# Patient Record
Sex: FEMALE | Race: WHITE | NOT HISPANIC OR LATINO | Employment: OTHER | URBAN - METROPOLITAN AREA
[De-identification: names, ages, dates, MRNs, and addresses within clinical notes are randomized per-mention and may not be internally consistent; named-entity substitution may affect disease eponyms.]

---

## 2017-05-09 ENCOUNTER — APPOINTMENT (OUTPATIENT)
Dept: WOMENS IMAGING | Facility: HOSPITAL | Age: 72
End: 2017-05-09

## 2017-05-09 PROCEDURE — 77063 BREAST TOMOSYNTHESIS BI: CPT | Performed by: RADIOLOGY

## 2017-05-09 PROCEDURE — G0202 SCR MAMMO BI INCL CAD: HCPCS | Performed by: RADIOLOGY

## 2018-05-29 ENCOUNTER — APPOINTMENT (OUTPATIENT)
Dept: WOMENS IMAGING | Facility: HOSPITAL | Age: 73
End: 2018-05-29

## 2018-05-29 PROCEDURE — 77067 SCR MAMMO BI INCL CAD: CPT | Performed by: RADIOLOGY

## 2018-05-29 PROCEDURE — 77063 BREAST TOMOSYNTHESIS BI: CPT | Performed by: RADIOLOGY

## 2018-11-02 ENCOUNTER — OFFICE VISIT (OUTPATIENT)
Dept: CARDIOLOGY | Facility: CLINIC | Age: 73
End: 2018-11-02

## 2018-11-02 VITALS
WEIGHT: 196 LBS | DIASTOLIC BLOOD PRESSURE: 90 MMHG | BODY MASS INDEX: 37 KG/M2 | HEIGHT: 61 IN | SYSTOLIC BLOOD PRESSURE: 150 MMHG | HEART RATE: 72 BPM

## 2018-11-02 DIAGNOSIS — E78.2 MIXED HYPERLIPIDEMIA: ICD-10-CM

## 2018-11-02 DIAGNOSIS — R00.2 PALPITATIONS: Primary | ICD-10-CM

## 2018-11-02 DIAGNOSIS — I49.1 APC (ATRIAL PREMATURE CONTRACTIONS): ICD-10-CM

## 2018-11-02 DIAGNOSIS — E66.9 OBESITY, CLASS II, BMI 35-39.9, ISOLATED (SEE ACTUAL BMI): ICD-10-CM

## 2018-11-02 PROCEDURE — 93000 ELECTROCARDIOGRAM COMPLETE: CPT | Performed by: INTERNAL MEDICINE

## 2018-11-02 PROCEDURE — 99214 OFFICE O/P EST MOD 30 MIN: CPT | Performed by: INTERNAL MEDICINE

## 2018-11-02 RX ORDER — LEVOTHYROXINE SODIUM 0.03 MG/1
25 TABLET ORAL DAILY
COMMUNITY
End: 2022-06-02 | Stop reason: SDUPTHER

## 2018-11-02 RX ORDER — SENNOSIDES 8.6 MG
1 TABLET ORAL NIGHTLY
COMMUNITY

## 2018-11-02 RX ORDER — GABAPENTIN 300 MG/1
300 CAPSULE ORAL EVERY EVENING
COMMUNITY

## 2018-11-02 RX ORDER — BENZALKONIUM CHLORIDE 170 MG/89ML
1 GEL TOPICAL 2 TIMES DAILY
COMMUNITY
End: 2020-01-14 | Stop reason: HOSPADM

## 2018-11-02 RX ORDER — CLINDAMYCIN PHOSPHATE 10 MG/G
GEL TOPICAL DAILY
COMMUNITY
End: 2020-01-06

## 2018-11-02 RX ORDER — TRIAMCINOLONE ACETONIDE 55 UG/1
2 SPRAY, METERED NASAL DAILY
COMMUNITY

## 2018-11-02 RX ORDER — BUPROPION HYDROCHLORIDE 150 MG/1
150 TABLET, EXTENDED RELEASE ORAL EVERY MORNING
COMMUNITY
End: 2022-06-02 | Stop reason: SDUPTHER

## 2018-11-02 NOTE — PROGRESS NOTES
Date of Office Visit: 18  Encounter Provider: Alex Poole MD  Place of Service: [facility] CARDIOLOGY  Patient Name: Valerie Rock  :1945  Referral Provider:Alex oPole MD      Chief Complaint   Patient presents with   • Shortness of Breath   • Fatigue     History of Present Illness  The patient is a pleasant 73-year-old female who we have seen in the past for atypical  chest pain, a normal cardiac catheterization. Echocardiogram showed some mild aortic root  enlargement and mild aortic insufficiency, and mitral valve prolapse; however, a followup  echocardiogram in  did not show any of those things.  She comes in for follow-up.  It's been a couple years since she was last here.  She's had thyroid surgery another nodule was benign but she has Hashimoto's thyroiditis.  She also has severe bladder incontinence which is been an extreme problem it's been keeping her from doing any kind of activity at all including walking.  Because that she's gained weight.  She does have shortness of breath ongoing about 10 minutes of activity but her back also hurts which inhibits that.  She it's occasional increased heart rate maybe 2 times a week lasting about a minute without any associated symptoms.  Denies chest pain or pressure.  Denies orthopnea or PND denies near syncope or syncope.  Overall she is a little distraught over this urinary incontinence really kept her from doing almost anything.          Past Medical History:   Diagnosis Date   • Aortic root enlargement (CMS/HCC)    • Aortic valve insufficiency    • Chest pain    • Hashimoto's thyroiditis    • Hyperlipidemia    • MVP (mitral valve prolapse)    • Urinary incontinence    • Vaginal prolapse          Past Surgical History:   Procedure Laterality Date   • BLADDER SURGERY     • CARDIAC CATHETERIZATION     • COLONOSCOPY     • CYSTOSCOPY BOTOX INJECTION OF BLADDER     • HYSTERECTOMY     • SACROSPINUS SUSPENSION      • THYROID SURGERY     • TONSILLECTOMY           Current Outpatient Prescriptions on File Prior to Visit   Medication Sig Dispense Refill   • albuterol (PROVENTIL HFA) 108 (90 BASE) MCG/ACT inhaler Proventil HFA AERS; Patient Sig: Proventil HFA AERS ; 0; 04-Apr-2014; Active     • albuterol (PROVENTIL) (2.5 MG/3ML) 0.083% nebulizer solution Albuterol Sulfate (2.5 MG/3ML) 0.083% Inhalation Nebulization Solution; Patient Sig: Albuterol Sulfate (2.5 MG/3ML) 0.083% Inhalation Nebulization Solution USE 1 UNIT DOSE EVERY 4-6 HOURS AS NEEDED FOR WHEEZING .; 0; 04-Apr-2014; Active     • Ascorbic Acid (VITAMIN C) 500 MG capsule Take 2 tablets by mouth 2 (two) times a day.     • aspirin 81 MG EC tablet Take  by mouth daily.     • budesonide-formoterol (SYMBICORT) 80-4.5 MCG/ACT inhaler Inhale 2 puffs 2 (two) times a day.     • Cholecalciferol (VITAMIN D3) 2000 UNITS capsule Take  by mouth 2 (two) times a day.     • citalopram (CeleXA) 40 MG tablet Take  by mouth daily.     • Coenzyme Q10 (COQ-10 PO) Take  by mouth daily.     • Dextromethorphan-Guaifenesin (MUCINEX DM MAXIMUM STRENGTH)  MG tablet sustained-release 12 hour Take  by mouth as needed.     • Ferrous Sulfate (IRON) 325 (65 FE) MG tablet Take 2 tablets by mouth As Needed.     • fexofenadine (ALLEGRA) 180 MG tablet Take  by mouth daily.     • Magnesium 500 MG capsule Take  by mouth daily.     • Omega-3 Fatty Acids (OMEGA-3 FISH OIL PO) Take  by mouth 2 (two) times a day.     • pantoprazole (PROTONIX) 40 MG EC tablet Take  by mouth daily.     • polyvinyl alcohol-povidone (REFRESH) 1.4-0.6 % ophthalmic solution Apply  to eye.     • pravastatin (PRAVACHOL) 40 MG tablet Take  by mouth daily.     • Pyridoxine HCl (VITAMIN B-6 PO) Take  by mouth daily.     • raloxifene (EVISTA) 60 MG tablet Take  by mouth daily.     • vitamin B-12 (CYANOCOBALAMIN) 250 MCG tablet Take  by mouth daily.     • Vitamin E 400 UNITS tablet Take  by mouth daily.     • [DISCONTINUED]  Calcium Carbonate (CALCIUM CHEW) 500 MG chewable tablet Chew 1 tablet 3 (three) times a day.     • [DISCONTINUED] Glucosamine HCl 1500 MG tablet Take  by mouth daily.     • [DISCONTINUED] Lactulose powder 2 (two) times a day.     • [DISCONTINUED] Resveratrol 50 MG capsule Take  by mouth daily.     • [DISCONTINUED] sodium chloride (OCEAN) 0.65 % nasal spray into each nostril.       No current facility-administered medications on file prior to visit.          Social History     Social History   • Marital status:      Spouse name: N/A   • Number of children: N/A   • Years of education: N/A     Occupational History   • Not on file.     Social History Main Topics   • Smoking status: Never Smoker   • Smokeless tobacco: Not on file   • Alcohol use No   • Drug use: Unknown   • Sexual activity: Defer     Other Topics Concern   • Not on file     Social History Narrative   • No narrative on file       Family History   Problem Relation Age of Onset   • Heart attack Mother    • Stroke Mother          Review of Systems   Constitution: Positive for malaise/fatigue. Negative for decreased appetite, diaphoresis, fever, weakness, weight gain and weight loss.   HENT: Negative for congestion, hearing loss, nosebleeds and tinnitus.    Eyes: Negative for blurred vision, double vision, vision loss in left eye, vision loss in right eye and visual disturbance.   Cardiovascular:        As noted in HPI   Respiratory:        As noted HPI   Endocrine: Negative for cold intolerance and heat intolerance.   Hematologic/Lymphatic: Negative for bleeding problem. Does not bruise/bleed easily.   Skin: Negative for color change, flushing, itching and rash.   Musculoskeletal: Negative for arthritis, back pain, joint pain, joint swelling, muscle weakness and myalgias.   Gastrointestinal: Negative for bloating, abdominal pain, constipation, diarrhea, dysphagia, heartburn, hematemesis, hematochezia, melena, nausea and vomiting.   Genitourinary:  "Negative for bladder incontinence, dysuria, frequency, nocturia and urgency.   Neurological: Negative for dizziness, focal weakness, headaches, light-headedness, loss of balance, numbness, paresthesias and vertigo.   Psychiatric/Behavioral: Positive for depression. Negative for memory loss and substance abuse. The patient is nervous/anxious.        Procedures      ECG 12 Lead  Date/Time: 11/2/2018 3:23 PM  Performed by: SHIRA WOOD  Authorized by: SHIRA WOOD   Comparison: compared with previous ECG   Similar to previous ECG  Rhythm: sinus rhythm  Ectopy: atrial premature contractions  Rate: normal  QRS axis: normal                  Objective:    /90 (BP Location: Left arm)   Pulse 72   Ht 154.9 cm (61\")   Wt 88.9 kg (196 lb)   BMI 37.03 kg/m²        Physical Exam  Physical Exam   Constitutional: She is oriented to person, place, and time. She appears well-developed and well-nourished. No distress.   HENT:   Head: Normocephalic.   Eyes: Pupils are equal, round, and reactive to light. Conjunctivae are normal. No scleral icterus.   Neck: Normal carotid pulses, no hepatojugular reflux and no JVD present. Carotid bruit is not present. No tracheal deviation, no edema and no erythema present. No thyromegaly present.   Cardiovascular: Normal rate, regular rhythm, S1 normal, S2 normal, normal heart sounds and intact distal pulses.  Frequent extrasystoles are present. PMI is not displaced.  Exam reveals no gallop, no distant heart sounds and no friction rub.    No murmur heard.  Pulses:       Carotid pulses are 2+ on the right side, and 2+ on the left side.       Radial pulses are 2+ on the right side, and 2+ on the left side.        Femoral pulses are 2+ on the right side, and 2+ on the left side.       Dorsalis pedis pulses are 2+ on the right side, and 2+ on the left side.        Posterior tibial pulses are 2+ on the right side, and 2+ on the left side.   Pulmonary/Chest: Effort normal and breath " sounds normal. No respiratory distress. She has no decreased breath sounds. She has no wheezes. She has no rhonchi. She has no rales. She exhibits no tenderness.   Abdominal: Soft. Bowel sounds are normal. She exhibits no distension and no mass. There is no hepatosplenomegaly. There is no tenderness. There is no rebound and no guarding.   Musculoskeletal: She exhibits edema (1+ bilateral pretibial). She exhibits no tenderness or deformity.   Neurological: She is alert and oriented to person, place, and time.   Skin: Skin is warm and dry. No rash noted. She is not diaphoretic. No cyanosis or erythema. No pallor. Nails show no clubbing.   Psychiatric: She has a normal mood and affect. Her speech is normal and behavior is normal. Judgment and thought content normal.           Assessment:   1. This is a 73-year-old female  History of valvular heart disease and aortic enlargement with aortic insufficiency and questionable mitral valve prolapse; however, follow-up echocardiogram did not confirm this.  Follow-up echocardiogram in 2016 no evidence of aortic insufficiency normal LV function and no prolapse.  2.  Palpitations/this appears to be due to premature atrial beats noted on her EKG she's probably having brief runs with these episodes of palpitations with asked her to keep track of those if they increase in frequency or severity she's to call back if not we'll see us in follow-up in a year.  3. History of hyperlipidemia.  on pravastatin followed by PCP.  4.  Hashimoto's thyroiditis positive endocrine.  5.  Severe urinary incontinence working with urology she feels like if she could get this under control she concern exercising and losing some weight.  6.  Obesity body mass index of 37.  I really do believe she wants to try to exercise and lose weight.hopefully will benefit started on that.         Plan:

## 2019-08-20 ENCOUNTER — APPOINTMENT (OUTPATIENT)
Dept: WOMENS IMAGING | Facility: HOSPITAL | Age: 74
End: 2019-08-20

## 2019-08-20 PROCEDURE — 77067 SCR MAMMO BI INCL CAD: CPT | Performed by: RADIOLOGY

## 2019-08-20 PROCEDURE — 77063 BREAST TOMOSYNTHESIS BI: CPT | Performed by: RADIOLOGY

## 2019-11-13 ENCOUNTER — OFFICE VISIT (OUTPATIENT)
Dept: CARDIOLOGY | Facility: CLINIC | Age: 74
End: 2019-11-13

## 2019-11-13 VITALS
WEIGHT: 186.8 LBS | HEART RATE: 71 BPM | DIASTOLIC BLOOD PRESSURE: 62 MMHG | BODY MASS INDEX: 36.67 KG/M2 | SYSTOLIC BLOOD PRESSURE: 132 MMHG | HEIGHT: 60 IN | RESPIRATION RATE: 16 BRPM

## 2019-11-13 DIAGNOSIS — R00.2 PALPITATIONS: Primary | ICD-10-CM

## 2019-11-13 DIAGNOSIS — I49.1 APC (ATRIAL PREMATURE CONTRACTIONS): ICD-10-CM

## 2019-11-13 DIAGNOSIS — E78.2 MIXED HYPERLIPIDEMIA: ICD-10-CM

## 2019-11-13 DIAGNOSIS — E66.9 OBESITY, CLASS II, BMI 35-39.9, ISOLATED (SEE ACTUAL BMI): ICD-10-CM

## 2019-11-13 PROCEDURE — 99213 OFFICE O/P EST LOW 20 MIN: CPT | Performed by: INTERNAL MEDICINE

## 2019-11-13 PROCEDURE — 93000 ELECTROCARDIOGRAM COMPLETE: CPT | Performed by: INTERNAL MEDICINE

## 2019-11-13 RX ORDER — PROMETHAZINE HYDROCHLORIDE AND PHENYLEPHRINE HYDROCHLORIDE 6.25; 5 MG/5ML; MG/5ML
SYRUP ORAL EVERY 4 HOURS PRN
COMMUNITY
End: 2020-01-06

## 2019-11-13 RX ORDER — BENZONATATE 200 MG/1
200 CAPSULE ORAL 3 TIMES DAILY PRN
COMMUNITY
End: 2020-01-06

## 2019-11-13 NOTE — PROGRESS NOTES
Date of Office Visit: 19  Encounter Provider: Alex Poole MD  Place of Service: [facility] CARDIOLOGY  Patient Name: Valerie Rock  :1945  Referral Provider:Referring, Self      No chief complaint on file.    History of Present Illness  The patient is a pleasant 74-year-old female who we have seen in the past for atypical  chest pain, a normal cardiac catheterization. Echocardiogram showed some mild aortic root  enlargement and mild aortic insufficiency, and mitral valve prolapse; however, a followup  echocardiogram in  did not show any of those things.  She comes in for follow-up.  Doing about the same she may get palpitations a few times a week lasting a few minutes without any associated symptoms.  She has had some balance issues and tripped and fell about 3 times its been a while though now.  No dizziness, near-syncope or syncope.  No chest pain or pressure.  No shortness of breath, orthopnea, or PND.  Overall she feels like she is doing reasonably well.          Past Medical History:   Diagnosis Date   • Aortic root enlargement (CMS/HCC)    • Aortic valve insufficiency    • Chest pain    • Hashimoto's thyroiditis    • Hyperlipidemia    • MVP (mitral valve prolapse)    • Urinary incontinence    • Vaginal prolapse          Past Surgical History:   Procedure Laterality Date   • BLADDER SURGERY     • CARDIAC CATHETERIZATION     • COLONOSCOPY     • CYSTOSCOPY BOTOX INJECTION OF BLADDER     • HYSTERECTOMY     • SACROSPINUS SUSPENSION     • THYROID SURGERY     • TONSILLECTOMY           Current Outpatient Medications on File Prior to Visit   Medication Sig Dispense Refill   • albuterol (PROVENTIL HFA) 108 (90 BASE) MCG/ACT inhaler Proventil HFA AERS; Patient Sig: Proventil HFA AERS ; 0; 2014; Active     • albuterol (PROVENTIL) (2.5 MG/3ML) 0.083% nebulizer solution Albuterol Sulfate (2.5 MG/3ML) 0.083% Inhalation Nebulization Solution; Patient Sig: Albuterol  Sulfate (2.5 MG/3ML) 0.083% Inhalation Nebulization Solution USE 1 UNIT DOSE EVERY 4-6 HOURS AS NEEDED FOR WHEEZING .; 0; 04-Apr-2014; Active     • Ascorbic Acid (VITAMIN C) 500 MG capsule Take 2 tablets by mouth 2 (two) times a day.     • aspirin 81 MG EC tablet Take  by mouth daily.     • benzonatate (TESSALON) 200 MG capsule Take 200 mg by mouth 3 (Three) Times a Day As Needed for Cough.     • budesonide-formoterol (SYMBICORT) 80-4.5 MCG/ACT inhaler Inhale 2 puffs 2 (two) times a day.     • buPROPion SR (WELLBUTRIN SR) 150 MG 12 hr tablet Take 150 mg by mouth Daily.     • Cholecalciferol (VITAMIN D3) 2000 UNITS capsule Take  by mouth 2 (two) times a day.     • citalopram (CeleXA) 40 MG tablet Take  by mouth daily.     • clindamycin (CLINDAGEL) 1 % gel Apply  topically to the appropriate area as directed Daily.     • Coenzyme Q10 (COQ-10 PO) Take  by mouth daily.     • Dextromethorphan-Guaifenesin (MUCINEX DM MAXIMUM STRENGTH)  MG tablet sustained-release 12 hour Take  by mouth as needed.     • Ferrous Sulfate (IRON) 325 (65 FE) MG tablet Take 2 tablets by mouth As Needed.     • fexofenadine (ALLEGRA) 180 MG tablet Take  by mouth daily.     • gabapentin (NEURONTIN) 300 MG capsule Take 300 mg by mouth Daily.     • levothyroxine (SYNTHROID, LEVOTHROID) 25 MCG tablet Take 25 mcg by mouth Daily.     • Magnesium 500 MG capsule Take  by mouth daily.     • Mirabegron ER (MYRBETRIQ) 50 MG tablet sustained-release 24 hour 24 hr tablet Take 50 mg by mouth Daily.     • Omega-3 Fatty Acids (OMEGA-3 FISH OIL PO) Take  by mouth 2 (two) times a day.     • pantoprazole (PROTONIX) 40 MG EC tablet Take  by mouth daily.     • polyvinyl alcohol-povidone (REFRESH) 1.4-0.6 % ophthalmic solution Apply  to eye.     • pravastatin (PRAVACHOL) 40 MG tablet Take  by mouth daily.     • promethazine-phenylephrine 6.25-5 MG/5ML syrup syrup Take  by mouth Every 4 (Four) Hours As Needed.     • Pyridoxine HCl (VITAMIN B-6 PO) Take  by mouth  daily.     • raloxifene (EVISTA) 60 MG tablet Take  by mouth daily.     • senna (SENOKOT) 8.6 MG tablet tablet Take 1 tablet by mouth Every Night.     • Triamcinolone Acetonide (NASACORT) 55 MCG/ACT nasal inhaler 2 sprays into the nostril(s) as directed by provider Daily.     • vitamin B-12 (CYANOCOBALAMIN) 250 MCG tablet Take  by mouth daily.     • Vitamin E 400 UNITS tablet Take  by mouth daily.     • Wound Dressings (REVITADERM WOUND CARE) gel Apply  topically Daily.       No current facility-administered medications on file prior to visit.          Social History     Socioeconomic History   • Marital status:      Spouse name: Not on file   • Number of children: Not on file   • Years of education: Not on file   • Highest education level: Not on file   Tobacco Use   • Smoking status: Never Smoker   • Smokeless tobacco: Never Used   • Tobacco comment: caffeine use    Substance and Sexual Activity   • Alcohol use: No   • Sexual activity: Defer       Family History   Problem Relation Age of Onset   • Heart attack Mother    • Stroke Mother          Review of Systems   Constitution: Positive for malaise/fatigue. Negative for decreased appetite, diaphoresis, fever, weakness, weight gain and weight loss.   HENT: Negative for congestion, hearing loss, nosebleeds and tinnitus.    Eyes: Negative for blurred vision, double vision, vision loss in left eye, vision loss in right eye and visual disturbance.   Cardiovascular:        As noted in HPI   Respiratory:        As noted HPI   Endocrine: Negative for cold intolerance and heat intolerance.   Hematologic/Lymphatic: Negative for bleeding problem. Does not bruise/bleed easily.   Skin: Negative for color change, flushing, itching and rash.   Musculoskeletal: Negative for arthritis, back pain, joint pain, joint swelling, muscle weakness and myalgias.   Gastrointestinal: Negative for bloating, abdominal pain, constipation, diarrhea, dysphagia, heartburn, hematemesis,  "hematochezia, melena, nausea and vomiting.   Genitourinary: Positive for frequency. Negative for bladder incontinence, dysuria, nocturia and urgency.   Neurological: Negative for dizziness, focal weakness, headaches, light-headedness, loss of balance, numbness, paresthesias and vertigo.   Psychiatric/Behavioral: Positive for depression. Negative for memory loss and substance abuse. The patient is nervous/anxious.        Procedures      ECG 12 Lead  Date/Time: 11/13/2019 11:03 AM  Performed by: Alex Poole MD  Authorized by: Alex Poole MD   Comparison: compared with previous ECG   Similar to previous ECG  Rhythm: sinus rhythm  Rate: normal  QRS axis: normal                  Objective:    /62 (BP Location: Right arm, Patient Position: Sitting)   Pulse 71   Resp 16   Ht 152.4 cm (60\")   Wt 84.7 kg (186 lb 12.8 oz)   BMI 36.48 kg/m²        Physical Exam  Physical Exam   Constitutional: She is oriented to person, place, and time. She appears well-developed and well-nourished. No distress.   HENT:   Head: Normocephalic.   Eyes: Conjunctivae are normal. Pupils are equal, round, and reactive to light. No scleral icterus.   Neck: Normal carotid pulses, no hepatojugular reflux and no JVD present. Carotid bruit is not present. No tracheal deviation, no edema and no erythema present. No thyromegaly present.   Cardiovascular: Normal rate, regular rhythm, S1 normal, S2 normal, normal heart sounds and intact distal pulses.  No extrasystoles are present. PMI is not displaced. Exam reveals no gallop, no distant heart sounds and no friction rub.   No murmur heard.  Pulses:       Carotid pulses are 2+ on the right side, and 2+ on the left side.       Radial pulses are 2+ on the right side, and 2+ on the left side.        Femoral pulses are 2+ on the right side, and 2+ on the left side.       Dorsalis pedis pulses are 2+ on the right side, and 2+ on the left side.        Posterior tibial pulses are 2+ on the " right side, and 2+ on the left side.   Pulmonary/Chest: Effort normal and breath sounds normal. No respiratory distress. She has no decreased breath sounds. She has no wheezes. She has no rhonchi. She has no rales. She exhibits no tenderness.   Abdominal: Soft. Bowel sounds are normal. She exhibits no distension and no mass. There is no hepatosplenomegaly. There is no tenderness. There is no rebound and no guarding.   Musculoskeletal: She exhibits edema (1+ bilateral tibial). She exhibits no tenderness or deformity.   Neurological: She is alert and oriented to person, place, and time.   Skin: Skin is warm and dry. No rash noted. She is not diaphoretic. No cyanosis or erythema. No pallor. Nails show no clubbing.   Psychiatric: She has a normal mood and affect. Her speech is normal and behavior is normal. Judgment and thought content normal.           Assessment:   1. This is a 74-year-old female  History of valvular heart disease and aortic enlargement with aortic insufficiency.  Follow-up echocardiograms now on a couple of occasions of not confirmed that the last in 2016.  2. Questionable mitral valve prolapse; however, follow-up echocardiogram did not confirm this.    3.  Palpitations/this appears to be due to premature atrial beats.  She is relatively asymptomatic however she is in a keep track of how often the occur and how long they last.  If she is stable she will see us again in follow-up in 1 year.  4. History of hyperlipidemia.  on pravastatin followed by PCP.  5.  Hashimoto's thyroiditis follows with endocrine  6.  Severe urinary incontinence.  7.  Obesity body mass index of 37.          Plan:

## 2020-01-06 ENCOUNTER — APPOINTMENT (OUTPATIENT)
Dept: PREADMISSION TESTING | Facility: HOSPITAL | Age: 75
End: 2020-01-06

## 2020-01-06 VITALS
RESPIRATION RATE: 16 BRPM | HEART RATE: 78 BPM | BODY MASS INDEX: 38.09 KG/M2 | DIASTOLIC BLOOD PRESSURE: 71 MMHG | HEIGHT: 60 IN | SYSTOLIC BLOOD PRESSURE: 158 MMHG | WEIGHT: 194 LBS | TEMPERATURE: 97.9 F | OXYGEN SATURATION: 99 %

## 2020-01-06 LAB
ANION GAP SERPL CALCULATED.3IONS-SCNC: 9.2 MMOL/L (ref 5–15)
BUN BLD-MCNC: 14 MG/DL (ref 8–23)
BUN/CREAT SERPL: 25.5 (ref 7–25)
CALCIUM SPEC-SCNC: 8.7 MG/DL (ref 8.6–10.5)
CHLORIDE SERPL-SCNC: 98 MMOL/L (ref 98–107)
CO2 SERPL-SCNC: 27.8 MMOL/L (ref 22–29)
CREAT BLD-MCNC: 0.55 MG/DL (ref 0.57–1)
DEPRECATED RDW RBC AUTO: 44.7 FL (ref 37–54)
ERYTHROCYTE [DISTWIDTH] IN BLOOD BY AUTOMATED COUNT: 15.5 % (ref 12.3–15.4)
GFR SERPL CREATININE-BSD FRML MDRD: 108 ML/MIN/1.73
GLUCOSE BLD-MCNC: 95 MG/DL (ref 65–99)
HCT VFR BLD AUTO: 31.9 % (ref 34–46.6)
HGB BLD-MCNC: 9.6 G/DL (ref 12–15.9)
MCH RBC QN AUTO: 23.6 PG (ref 26.6–33)
MCHC RBC AUTO-ENTMCNC: 30.1 G/DL (ref 31.5–35.7)
MCV RBC AUTO: 78.6 FL (ref 79–97)
PLATELET # BLD AUTO: 321 10*3/MM3 (ref 140–450)
PMV BLD AUTO: 9.4 FL (ref 6–12)
POTASSIUM BLD-SCNC: 4.4 MMOL/L (ref 3.5–5.2)
RBC # BLD AUTO: 4.06 10*6/MM3 (ref 3.77–5.28)
SODIUM BLD-SCNC: 135 MMOL/L (ref 136–145)
WBC NRBC COR # BLD: 4.88 10*3/MM3 (ref 3.4–10.8)

## 2020-01-06 PROCEDURE — 80048 BASIC METABOLIC PNL TOTAL CA: CPT | Performed by: OBSTETRICS & GYNECOLOGY

## 2020-01-06 PROCEDURE — 36415 COLL VENOUS BLD VENIPUNCTURE: CPT

## 2020-01-06 PROCEDURE — 85027 COMPLETE CBC AUTOMATED: CPT | Performed by: OBSTETRICS & GYNECOLOGY

## 2020-01-06 RX ORDER — ECHINACEA PURPUREA EXTRACT 125 MG
1 TABLET ORAL AS NEEDED
COMMUNITY

## 2020-01-06 RX ORDER — MONTELUKAST SODIUM 10 MG/1
10 TABLET ORAL NIGHTLY
COMMUNITY
End: 2021-11-18 | Stop reason: ALTCHOICE

## 2020-01-06 NOTE — DISCHARGE INSTRUCTIONS
Take the following medications the morning of surgery:      SYMBICORT, PANTOPRAZOLE     NEBULIZER IF NEEDED    General Instructions:  • Do not eat solid food after midnight the night before surgery.  • You may drink clear liquids day of surgery but must stop at least one hour before your hospital arrival time.  • It is beneficial for you to have a clear drink that contains carbohydrates the day of surgery.  We suggest a 12 to 20 ounce bottle of Gatorade or Powerade for non-diabetic patients       Clear liquids are liquids you can see through.  Nothing red in color.     Plain water                               Sports drinks  Sodas                                   Gelatin (Jell-O)  Fruit juices without pulp such as white grape juice and apple juice  Popsicles that contain no fruit or yogurt  Tea or coffee (no cream or milk added)  Gatorade / Powerade  G2 / Powerade Zero    • If applicable bring your C-PAP/ BI-PAP machine.  • Bring any papers given to you in the doctor’s office.  • Wear clean comfortable clothes.  • Do not wear contact lenses, false eyelashes or make-up.  Bring a case for your glasses.   • Remove all piercings.  Leave jewelry and any other valuables at home.  • Hair extensions with metal clips must be removed prior to surgery.  • The Pre-Admission Testing nurse will instruct you to bring medications if unable to obtain an accurate list in Pre-Admission Testing.  • REPORT TO MAIN SURGERY ON 1-14-20 AT 0530           Preventing a Surgical Site Infection:  • For 2 to 3 days before surgery, avoid shaving with a razor because the razor can irritate skin and make it easier to develop an infection.    • Any areas of open skin can increase the risk of a post-operative wound infection by allowing bacteria to enter and travel throughout the body.  Notify your surgeon if you have any skin wounds / rashes even if it is not near the expected surgical site.  The area will need assessed to determine if surgery  should be delayed until it is healed.  • The night prior to surgery sleep in a clean bed with clean clothing.  Do not allow pets to sleep with you.  • Shower on the morning of surgery using a fresh bar of anti-bacterial soap (such as Dial) and clean washcloth.  Dry with a clean towel and dress in clean clothing.  • Ask your surgeon if you will be receiving antibiotics prior to surgery.  • Make sure you, your family, and all healthcare providers clean their hands with soap and water or an alcohol based hand  before caring for you or your wound.    Day of surgery:  Your arrival time is approximately two hours before your scheduled surgery time.  Upon arrival, a Pre-op nurse and Anesthesiologist will review your health history, obtain vital signs, and answer questions you may have.  The only belongings needed at this time will be a list of your home medications and if applicable your C-PAP/BI-PAP machine.  If you are staying overnight your family can leave the rest of your belongings in the car and bring them to your room later.  A Pre-op nurse will start an IV and you may receive medication in preparation for surgery, including something to help you relax.  Your family will be able to see you in the Pre-op area.  Two visitors at a time will be allowed in the Pre-op room.  While you are in surgery your family should notify the waiting room  if they leave the waiting room area and provide a contact phone number.    Please be aware that surgery does come with discomfort.  We want to make every effort to control your discomfort so please discuss any uncontrolled symptoms with your nurse.   Your doctor will most likely have prescribed pain medications.      If you are going home after surgery you will receive individualized written care instructions before being discharged.  A responsible adult must drive you to and from the hospital on the day of your surgery and stay with you for 24 hours.        If  you have any questions please call Pre-Admission Testing at [phone].  Deductibles and co-payments are collected on the day of service. Please be prepared to pay the required co-pay, deductible or deposit on the day of service as defined by your plan.

## 2020-01-09 NOTE — H&P
Chief complaint Urinary incontinence     Subjective     History of Present Illness:  Patient is a 74 y.o. female presents with intrinsic sphincter deficiency. Patient had a SSLF in Feb 2018 and had subsequent urinary incontinence with limited response to botox. Patient had a UDS with leaking at 150 ml with UDSI and LPP of 0. Presents today for scheduled retropubic sling.     Family History   Problem Relation Age of Onset   • Heart attack Mother    • Stroke Mother    • Malig Hyperthermia Neg Hx      Social History     Tobacco Use   • Smoking status: Never Smoker   • Smokeless tobacco: Never Used   • Tobacco comment: caffeine use    Substance Use Topics   • Alcohol use: No   • Drug use: Never     Past Medical History:   Diagnosis Date   • Anxiety and depression    • Aortic root enlargement (CMS/HCC)    • Aortic valve insufficiency    • Arthritis    • Asthma    • Bladder spasms    • Blepharitis of both eyes    • Bone spur     LUMBAR   • Chronic constipation    • Diverticular disease    • Dry eyes    • Facial paresthesia 11/2018    HISTORY OF    • Fecal incontinence     AT TIMES   • GERD (gastroesophageal reflux disease)    • Hashimoto's thyroiditis    • History of fracture of nasal bone 07/22/2019    IN 3 PLACES AFTER FALL   • Hyperlipidemia    • MVP (mitral valve prolapse)    • RLS (restless legs syndrome)    • Seasonal allergies    • Sleep apnea     USES C-PAP   • Stress incontinence     WEARS PADS   • Tinnitus     BOTH EARS AT TIMES   • Urinary incontinence    • Vaginal prolapse      Past Surgical History:   Procedure Laterality Date   • BLADDER SURGERY  1980 & 2003   • BREAST BIOPSY Left 1969   • BREAST BIOPSY Right 1979   • BREAST CYST EXCISION Left 1985   • BREAST DUCT EXCISION Left 2005   • CARDIAC CATHETERIZATION     • CATARACT EXTRACTION W/ INTRAOCULAR LENS IMPLANT Bilateral     • COLONOSCOPY  2018   • CYSTOSCOPY BOTOX INJECTION OF BLADDER      X 2   • HYSTERECTOMY  1985   • INTERSTIM PERC  "TEST  10/17/2017    DR. BRYAN BARTON   • INTERSTIM PLACEMENT  10/31/2017    DR. BRYAN BARTON   • SACROSPINUS SUSPENSION  02/01/2018   • THYROID SURGERY Right 07/27/2017    NODULES REMOVED   • TONSILLECTOMY  1950     No medications prior to admission.     Patient has no known allergies.    Objective      Vitals:    01/14/20 0622   BP: 140/65   BP Location: Right arm   Patient Position: Lying   Pulse: 72   Resp: 16   Temp: 98.7 °F (37.1 °C)   TempSrc: Oral   SpO2: 96%   Weight: 86.4 kg (190 lb 9 oz)   Height: 152.4 cm (60\")       Review of Systems/Physical Exam:   Constitutional awake, in no acute distress   Pulmonary   Respiratory effort: No increased work of breathing or signs of respiratory distress.    Auscultation of lungs: Lungs are clear to auscultation bilaterally, with no wheezes, rales or rhonchi.   Cardiovascular   Auscultation of heart: Regular rate and rhythm, with no murmurs, rubs or gallops.   Examination of extremities for edema and/or varicosities: Normal.    Abdomen   Abdomen: Abdomen is soft, nontender, nondistended and with normal bowel sounds.   Liver and spleen: No hepatomegaly or splenomegaly.       Assessment/Plan     Sedation Plan: General  Treatment Plan/Procedure:     Proceed with sling retropubic and cystoscopy, consents reviewed  Anticipate dc home post op  -Cefazolin, SCDs    I discussed with the patient/legal representative the risks and the benefits associated with the proposed procedure(s).  The patient/legal representative has been given the opportunity to ask questions.  Alternatives to the proposed treatment (s) were discussed, including the likely results of no treatment.  The patient/legal representative wishes to proceed.       Nafisa Short MD  01/13/20  8:14 AM   I have reviewed the attached history and physical and there are no updates or changes to history and or physical.      "

## 2020-01-14 ENCOUNTER — ANESTHESIA EVENT (OUTPATIENT)
Dept: PERIOP | Facility: HOSPITAL | Age: 75
End: 2020-01-14

## 2020-01-14 ENCOUNTER — HOSPITAL ENCOUNTER (OUTPATIENT)
Facility: HOSPITAL | Age: 75
Discharge: HOME | End: 2020-01-14
Attending: OBSTETRICS & GYNECOLOGY | Admitting: OBSTETRICS & GYNECOLOGY

## 2020-01-14 ENCOUNTER — ANESTHESIA (OUTPATIENT)
Dept: PERIOP | Facility: HOSPITAL | Age: 75
End: 2020-01-14

## 2020-01-14 VITALS
HEART RATE: 69 BPM | OXYGEN SATURATION: 99 % | RESPIRATION RATE: 16 BRPM | BODY MASS INDEX: 37.41 KG/M2 | SYSTOLIC BLOOD PRESSURE: 131 MMHG | TEMPERATURE: 97.8 F | WEIGHT: 190.56 LBS | HEIGHT: 60 IN | DIASTOLIC BLOOD PRESSURE: 75 MMHG

## 2020-01-14 DIAGNOSIS — N39.3 STRESS INCONTINENCE: Primary | ICD-10-CM

## 2020-01-14 PROCEDURE — 25010000002 EPINEPHRINE PER 0.1 MG: Performed by: OBSTETRICS & GYNECOLOGY

## 2020-01-14 PROCEDURE — 25010000002 ONDANSETRON PER 1 MG: Performed by: NURSE ANESTHETIST, CERTIFIED REGISTERED

## 2020-01-14 PROCEDURE — 25010000003 CEFAZOLIN IN DEXTROSE 2-4 GM/100ML-% SOLUTION: Performed by: OBSTETRICS & GYNECOLOGY

## 2020-01-14 PROCEDURE — C1771 REP DEV, URINARY, W/SLING: HCPCS | Performed by: OBSTETRICS & GYNECOLOGY

## 2020-01-14 PROCEDURE — 63710000001 HYDROCODONE-ACETAMINOPHEN 7.5-325 MG TABLET: Performed by: NURSE ANESTHETIST, CERTIFIED REGISTERED

## 2020-01-14 PROCEDURE — A9270 NON-COVERED ITEM OR SERVICE: HCPCS | Performed by: NURSE ANESTHETIST, CERTIFIED REGISTERED

## 2020-01-14 PROCEDURE — 25010000002 PROPOFOL 10 MG/ML EMULSION: Performed by: NURSE ANESTHETIST, CERTIFIED REGISTERED

## 2020-01-14 PROCEDURE — 25010000002 FENTANYL CITRATE (PF) 100 MCG/2ML SOLUTION: Performed by: NURSE ANESTHETIST, CERTIFIED REGISTERED

## 2020-01-14 PROCEDURE — 25010000002 DEXAMETHASONE PER 1 MG: Performed by: NURSE ANESTHETIST, CERTIFIED REGISTERED

## 2020-01-14 PROCEDURE — 25010000002 FENTANYL CITRATE (PF) 100 MCG/2ML SOLUTION: Performed by: ANESTHESIOLOGY

## 2020-01-14 DEVICE — TRANSVAGINAL MID-URETHRAL SLING
Type: IMPLANTABLE DEVICE | Site: VAGINA | Status: FUNCTIONAL
Brand: ADVANTAGE FIT™  SYSTEM

## 2020-01-14 RX ORDER — ONDANSETRON 2 MG/ML
4 INJECTION INTRAMUSCULAR; INTRAVENOUS ONCE AS NEEDED
Status: DISCONTINUED | OUTPATIENT
Start: 2020-01-14 | End: 2020-01-14 | Stop reason: HOSPADM

## 2020-01-14 RX ORDER — PROMETHAZINE HYDROCHLORIDE 25 MG/1
25 SUPPOSITORY RECTAL ONCE AS NEEDED
Status: DISCONTINUED | OUTPATIENT
Start: 2020-01-14 | End: 2020-01-14 | Stop reason: HOSPADM

## 2020-01-14 RX ORDER — DIPHENHYDRAMINE HYDROCHLORIDE 50 MG/ML
12.5 INJECTION INTRAMUSCULAR; INTRAVENOUS
Status: DISCONTINUED | OUTPATIENT
Start: 2020-01-14 | End: 2020-01-14 | Stop reason: HOSPADM

## 2020-01-14 RX ORDER — PROMETHAZINE HYDROCHLORIDE 25 MG/ML
12.5 INJECTION, SOLUTION INTRAMUSCULAR; INTRAVENOUS ONCE AS NEEDED
Status: DISCONTINUED | OUTPATIENT
Start: 2020-01-14 | End: 2020-01-14 | Stop reason: HOSPADM

## 2020-01-14 RX ORDER — PROPOFOL 10 MG/ML
VIAL (ML) INTRAVENOUS AS NEEDED
Status: DISCONTINUED | OUTPATIENT
Start: 2020-01-14 | End: 2020-01-14 | Stop reason: SURG

## 2020-01-14 RX ORDER — SODIUM CHLORIDE, SODIUM LACTATE, POTASSIUM CHLORIDE, CALCIUM CHLORIDE 600; 310; 30; 20 MG/100ML; MG/100ML; MG/100ML; MG/100ML
INJECTION, SOLUTION INTRAVENOUS CONTINUOUS PRN
Status: DISCONTINUED | OUTPATIENT
Start: 2020-01-14 | End: 2020-01-14 | Stop reason: SURG

## 2020-01-14 RX ORDER — SODIUM CHLORIDE 9 MG/ML
INJECTION, SOLUTION INTRAVENOUS AS NEEDED
Status: DISCONTINUED | OUTPATIENT
Start: 2020-01-14 | End: 2020-01-14 | Stop reason: HOSPADM

## 2020-01-14 RX ORDER — IBUPROFEN 600 MG/1
600 TABLET ORAL EVERY 6 HOURS PRN
Qty: 20 TABLET | Refills: 0 | Status: SHIPPED | OUTPATIENT
Start: 2020-01-14 | End: 2023-01-11

## 2020-01-14 RX ORDER — PROMETHAZINE HYDROCHLORIDE 25 MG/1
25 TABLET ORAL ONCE AS NEEDED
Status: DISCONTINUED | OUTPATIENT
Start: 2020-01-14 | End: 2020-01-14 | Stop reason: HOSPADM

## 2020-01-14 RX ORDER — PROMETHAZINE HYDROCHLORIDE 25 MG/ML
6.25 INJECTION, SOLUTION INTRAMUSCULAR; INTRAVENOUS
Status: DISCONTINUED | OUTPATIENT
Start: 2020-01-14 | End: 2020-01-14 | Stop reason: HOSPADM

## 2020-01-14 RX ORDER — OXYCODONE AND ACETAMINOPHEN 7.5; 325 MG/1; MG/1
1 TABLET ORAL ONCE AS NEEDED
Status: DISCONTINUED | OUTPATIENT
Start: 2020-01-14 | End: 2020-01-14 | Stop reason: HOSPADM

## 2020-01-14 RX ORDER — MAGNESIUM HYDROXIDE 1200 MG/15ML
LIQUID ORAL AS NEEDED
Status: DISCONTINUED | OUTPATIENT
Start: 2020-01-14 | End: 2020-01-14 | Stop reason: HOSPADM

## 2020-01-14 RX ORDER — HYDRALAZINE HYDROCHLORIDE 20 MG/ML
5 INJECTION INTRAMUSCULAR; INTRAVENOUS
Status: DISCONTINUED | OUTPATIENT
Start: 2020-01-14 | End: 2020-01-14 | Stop reason: HOSPADM

## 2020-01-14 RX ORDER — FLUMAZENIL 0.1 MG/ML
0.2 INJECTION INTRAVENOUS AS NEEDED
Status: DISCONTINUED | OUTPATIENT
Start: 2020-01-14 | End: 2020-01-14 | Stop reason: HOSPADM

## 2020-01-14 RX ORDER — CEFAZOLIN SODIUM 2 G/100ML
2 INJECTION, SOLUTION INTRAVENOUS ONCE
Status: COMPLETED | OUTPATIENT
Start: 2020-01-14 | End: 2020-01-14

## 2020-01-14 RX ORDER — SODIUM CHLORIDE, SODIUM LACTATE, POTASSIUM CHLORIDE, CALCIUM CHLORIDE 600; 310; 30; 20 MG/100ML; MG/100ML; MG/100ML; MG/100ML
9 INJECTION, SOLUTION INTRAVENOUS CONTINUOUS
Status: DISCONTINUED | OUTPATIENT
Start: 2020-01-14 | End: 2020-01-14 | Stop reason: HOSPADM

## 2020-01-14 RX ORDER — SODIUM CHLORIDE 0.9 % (FLUSH) 0.9 %
3 SYRINGE (ML) INJECTION EVERY 12 HOURS SCHEDULED
Status: DISCONTINUED | OUTPATIENT
Start: 2020-01-14 | End: 2020-01-14 | Stop reason: HOSPADM

## 2020-01-14 RX ORDER — DOCUSATE SODIUM 100 MG/1
100 CAPSULE, LIQUID FILLED ORAL DAILY PRN
Qty: 30 CAPSULE | Refills: 1 | Status: SHIPPED | OUTPATIENT
Start: 2020-01-14 | End: 2020-09-29 | Stop reason: ALTCHOICE

## 2020-01-14 RX ORDER — FAMOTIDINE 10 MG/ML
20 INJECTION, SOLUTION INTRAVENOUS ONCE
Status: COMPLETED | OUTPATIENT
Start: 2020-01-14 | End: 2020-01-14

## 2020-01-14 RX ORDER — DEXAMETHASONE SODIUM PHOSPHATE 10 MG/ML
INJECTION INTRAMUSCULAR; INTRAVENOUS AS NEEDED
Status: DISCONTINUED | OUTPATIENT
Start: 2020-01-14 | End: 2020-01-14 | Stop reason: SURG

## 2020-01-14 RX ORDER — HYDROCODONE BITARTRATE AND ACETAMINOPHEN 7.5; 325 MG/1; MG/1
1 TABLET ORAL ONCE AS NEEDED
Status: COMPLETED | OUTPATIENT
Start: 2020-01-14 | End: 2020-01-14

## 2020-01-14 RX ORDER — SODIUM CHLORIDE 0.9 % (FLUSH) 0.9 %
10 SYRINGE (ML) INJECTION AS NEEDED
Status: DISCONTINUED | OUTPATIENT
Start: 2020-01-14 | End: 2020-01-14 | Stop reason: HOSPADM

## 2020-01-14 RX ORDER — EPHEDRINE SULFATE 50 MG/ML
5 INJECTION, SOLUTION INTRAVENOUS ONCE AS NEEDED
Status: DISCONTINUED | OUTPATIENT
Start: 2020-01-14 | End: 2020-01-14 | Stop reason: HOSPADM

## 2020-01-14 RX ORDER — HYDROMORPHONE HYDROCHLORIDE 1 MG/ML
0.5 INJECTION, SOLUTION INTRAMUSCULAR; INTRAVENOUS; SUBCUTANEOUS
Status: DISCONTINUED | OUTPATIENT
Start: 2020-01-14 | End: 2020-01-14 | Stop reason: HOSPADM

## 2020-01-14 RX ORDER — FENTANYL CITRATE 50 UG/ML
50 INJECTION, SOLUTION INTRAMUSCULAR; INTRAVENOUS
Status: DISCONTINUED | OUTPATIENT
Start: 2020-01-14 | End: 2020-01-14 | Stop reason: HOSPADM

## 2020-01-14 RX ORDER — OXYCODONE HYDROCHLORIDE AND ACETAMINOPHEN 5; 325 MG/1; MG/1
1-2 TABLET ORAL EVERY 6 HOURS PRN
Qty: 10 TABLET | Refills: 0 | Status: SHIPPED | OUTPATIENT
Start: 2020-01-14 | End: 2020-09-28 | Stop reason: ALTCHOICE

## 2020-01-14 RX ORDER — DIPHENHYDRAMINE HCL 25 MG
25 CAPSULE ORAL
Status: DISCONTINUED | OUTPATIENT
Start: 2020-01-14 | End: 2020-01-14 | Stop reason: HOSPADM

## 2020-01-14 RX ORDER — LIDOCAINE HYDROCHLORIDE 20 MG/ML
INJECTION, SOLUTION INFILTRATION; PERINEURAL AS NEEDED
Status: DISCONTINUED | OUTPATIENT
Start: 2020-01-14 | End: 2020-01-14 | Stop reason: SURG

## 2020-01-14 RX ORDER — LABETALOL HYDROCHLORIDE 5 MG/ML
5 INJECTION, SOLUTION INTRAVENOUS
Status: DISCONTINUED | OUTPATIENT
Start: 2020-01-14 | End: 2020-01-14 | Stop reason: HOSPADM

## 2020-01-14 RX ORDER — NALOXONE HCL 0.4 MG/ML
0.2 VIAL (ML) INJECTION AS NEEDED
Status: DISCONTINUED | OUTPATIENT
Start: 2020-01-14 | End: 2020-01-14 | Stop reason: HOSPADM

## 2020-01-14 RX ORDER — ACETAMINOPHEN 325 MG/1
650 TABLET ORAL ONCE AS NEEDED
Status: DISCONTINUED | OUTPATIENT
Start: 2020-01-14 | End: 2020-01-14 | Stop reason: HOSPADM

## 2020-01-14 RX ORDER — OXYCODONE HYDROCHLORIDE AND ACETAMINOPHEN 5; 325 MG/1; MG/1
1 TABLET ORAL ONCE AS NEEDED
Status: DISCONTINUED | OUTPATIENT
Start: 2020-01-14 | End: 2020-01-14 | Stop reason: HOSPADM

## 2020-01-14 RX ORDER — IBUPROFEN 600 MG/1
600 TABLET ORAL EVERY 6 HOURS PRN
Status: DISCONTINUED | OUTPATIENT
Start: 2020-01-14 | End: 2020-01-14 | Stop reason: HOSPADM

## 2020-01-14 RX ORDER — SODIUM CHLORIDE 0.9 % (FLUSH) 0.9 %
3-10 SYRINGE (ML) INJECTION AS NEEDED
Status: DISCONTINUED | OUTPATIENT
Start: 2020-01-14 | End: 2020-01-14 | Stop reason: HOSPADM

## 2020-01-14 RX ORDER — ONDANSETRON 2 MG/ML
INJECTION INTRAMUSCULAR; INTRAVENOUS AS NEEDED
Status: DISCONTINUED | OUTPATIENT
Start: 2020-01-14 | End: 2020-01-14 | Stop reason: SURG

## 2020-01-14 RX ORDER — LIDOCAINE HYDROCHLORIDE 10 MG/ML
0.5 INJECTION, SOLUTION EPIDURAL; INFILTRATION; INTRACAUDAL; PERINEURAL ONCE AS NEEDED
Status: DISCONTINUED | OUTPATIENT
Start: 2020-01-14 | End: 2020-01-14 | Stop reason: HOSPADM

## 2020-01-14 RX ADMIN — SODIUM CHLORIDE, POTASSIUM CHLORIDE, SODIUM LACTATE AND CALCIUM CHLORIDE: 600; 310; 30; 20 INJECTION, SOLUTION INTRAVENOUS at 06:48

## 2020-01-14 RX ADMIN — FENTANYL CITRATE 50 MCG: 50 INJECTION, SOLUTION INTRAMUSCULAR; INTRAVENOUS at 09:01

## 2020-01-14 RX ADMIN — HYDROCODONE BITARTRATE AND ACETAMINOPHEN 1 TABLET: 7.5; 325 TABLET ORAL at 09:22

## 2020-01-14 RX ADMIN — CEFAZOLIN SODIUM 2 G: 2 INJECTION, SOLUTION INTRAVENOUS at 07:32

## 2020-01-14 RX ADMIN — LIDOCAINE HYDROCHLORIDE 80 MG: 20 INJECTION, SOLUTION INFILTRATION; PERINEURAL at 07:36

## 2020-01-14 RX ADMIN — FAMOTIDINE 20 MG: 10 INJECTION INTRAVENOUS at 07:20

## 2020-01-14 RX ADMIN — FENTANYL CITRATE 25 MCG: 50 INJECTION INTRAMUSCULAR; INTRAVENOUS at 08:28

## 2020-01-14 RX ADMIN — DEXAMETHASONE SODIUM PHOSPHATE 6 MG: 10 INJECTION INTRAMUSCULAR; INTRAVENOUS at 08:11

## 2020-01-14 RX ADMIN — PROPOFOL 150 MG: 10 INJECTION, EMULSION INTRAVENOUS at 07:36

## 2020-01-14 RX ADMIN — FENTANYL CITRATE 50 MCG: 50 INJECTION, SOLUTION INTRAMUSCULAR; INTRAVENOUS at 09:11

## 2020-01-14 RX ADMIN — SODIUM CHLORIDE, POTASSIUM CHLORIDE, SODIUM LACTATE AND CALCIUM CHLORIDE 9 ML/HR: 600; 310; 30; 20 INJECTION, SOLUTION INTRAVENOUS at 07:21

## 2020-01-14 RX ADMIN — FENTANYL CITRATE 50 MCG: 50 INJECTION INTRAMUSCULAR; INTRAVENOUS at 07:36

## 2020-01-14 RX ADMIN — ONDANSETRON HYDROCHLORIDE 4 MG: 2 SOLUTION INTRAMUSCULAR; INTRAVENOUS at 08:41

## 2020-01-14 RX ADMIN — FENTANYL CITRATE 25 MCG: 50 INJECTION INTRAMUSCULAR; INTRAVENOUS at 08:21

## 2020-01-14 NOTE — OP NOTE
Retropubic sling    Patient Name:  Valerie Rock  YOB: 1945    Date of Surgery:  1/14/2020     Indications:  Stress urinary incontinence     Pre-op Diagnosis:   Stress urinary incontinence        Post-Op Diagnosis Codes:   Same    Procedure/CPT® Codes:      Procedure(s):  RETROPUBIC MID URATHRAL  SLING CYSTOCOPY    Staff:  Surgeon(s):  Miguelangel Spencer MD    Assistants  Nafisa Short MD     Anesthesia: General, LMA    Estimated Blood Loss: 20 ml     IV Fluids: 800 ml    Urine output: 250 ml    Implants:    [employer] Advantage Fit    Specimen:          None        Findings: Shortened vagina with scarred anterior wall. Hemostatic at completion of procedure. Normal cystoscopy with no evidence of mesh or bladder perforation.     Complications: None    Description of Procedure:     Patient was seen in the preoperative area where consents were reviewed and questions answered. Pt was brought to the OR with IV fluids running and IV cefazolin administered. General anesthesia was administered without difficulty and patient was repositioned into dorsal lithotomy. Time out was conducted confirming patient and procedure. Bladder was emptied using red rubber catheter.     Suprapubic areas of planned sling exit were marked 2 finger breadths lateral to the mildine bilaterally immediately superior to the pubic bone. The space of Retzius was infiltrated using the epinephrine solution 0.625 mg in 500 mL of normal saline (60 mL on each side). Attention was return to the anterior vagina. Two Allis' were placed at 1 cm proximal to the urethral meatus and at the urethro-vesicular junction framing the mid-urethra. Anterior vaginal wall was noted to be scarred in appearance. Both lateral sulci were injected with epinephrine solution using approximately 60 ml on each side and a 1 cm midline vertical incision was made in the vaginal mucosa over the mid-urethra with a #15 blade scalpel. The Metzenbaum scissors were  then used to dissect the vaginal mucosa off the underlying endopelvic fascia, toward the pubic rami until the pubic bone was reached thus creating a tunnel. The was done on both sides. The Dejesus catheter was used to drain the bladder.     The Advantage fit sling trocar was inserted through the suburethral incision into the right tunnel, guiding it under the inferior margin of the pubic symphysis and immediately upward through the retropubic space in close contact with the posterior surface of the pubic bone in line with the ipsilateral shoulder. The trocar was advanced until it penetrated the marked site in the suprapubic area on the patient's right side.  A cystoscopy was performed using a 70-degree lens to carefully inspect the bladder mucosa for evidence of needle perforation. There was no evidence of bladder perforation, the cystoscope was withdrawn and bladder emptied. The same procedure was repeated on the left side and cystoscopy repeated. The vaginal sulci were checked to ensure no injury to the vaginal mucosa. The tape was pulled upward through the suprapubic incisions using a Bernice clamp against the urethra to avoid over-tensioning. The midline blue tab was cut and the mesh was lifted up to confirm tension on the mesh. The plastic sheaths were then lifted and removed from over the mesh, with continuing positioning of the Bernice clamp against the urethra to avoid over-tensioning. The mesh over the urethra was seen to be laying flat and loose enough not to exert pressure on urethra at rest. Each end of the tape was then cut just below the skin level by depressing the skin around the suprapubic incisions while holding the tape. The vaginal incision was irrigated with sterile fluid and wound was found to be hemostatic. A deep mattress using 2.O Vicryl suture was placed over the in the vaginal mucosa overlying the mesh. The vaginal mucosa was closed in an interrupted fashion using 2.O Vicryl suture. Finally, the  skin insions were closed using 4.0 Monocryl in an interrupted subcuticular fashion.     All needle, sponge, and instrument counts were correct x 2 at the completion of the procedure.  The patient tolerated the procedure well.  She was cleaned, dried, and returned to supine position.  She was extubated without difficulty and transferred to the PACU in stable condition.  Miguelangel Spencer MD fully participated and was physically present for the entire duration of the procedure.        Nafisa Short MD     Date: 1/14/2020  Time: 7:02 AM

## 2020-01-14 NOTE — ANESTHESIA PREPROCEDURE EVALUATION
Anesthesia Evaluation     Patient summary reviewed and Nursing notes reviewed   NPO Solid Status: > 8 hours  NPO Liquid Status: > 2 hours           Airway   Mallampati: II  TM distance: >3 FB  Neck ROM: full  Possible difficult intubation  Dental - normal exam     Pulmonary - normal exam   (+) COPD mild, asthma,sleep apnea on CPAP,   Cardiovascular - normal exam  Exercise tolerance: good (4-7 METS)    (+) valvular problems/murmurs murmur, hyperlipidemia,       Neuro/Psych  GI/Hepatic/Renal/Endo    (+) obesity,  GERD,      Musculoskeletal     Abdominal    Substance History - negative use     OB/GYN negative ob/gyn ROS         Other   arthritis,                      Anesthesia Plan    ASA 3     general     intravenous induction     Anesthetic plan, all risks, benefits, and alternatives have been provided, discussed and informed consent has been obtained with: patient.    Plan discussed with CRNA.

## 2020-01-14 NOTE — ANESTHESIA POSTPROCEDURE EVALUATION
"Patient: Valerie Rock    Procedure Summary     Date:  01/14/20 Room / Location:  [facility] OR  / [facility] MAIN OR    Anesthesia Start:  0730 Anesthesia Stop:  0850    Procedure:  RETROPUBIC MID URATHRAL  SLING CYSTOCOPY (N/A Vagina) Diagnosis:      Surgeon:  Miguelangel Spencer MD Provider:  Rudolph Martinez MD    Anesthesia Type:  general ASA Status:  3          Anesthesia Type: general    Vitals  Vitals Value Taken Time   /68 1/14/2020  9:30 AM   Temp 36.6 °C (97.8 °F) 1/14/2020  9:30 AM   Pulse 81 1/14/2020  9:30 AM   Resp 16 1/14/2020  9:30 AM   SpO2 90 % 1/14/2020  9:30 AM           Post Anesthesia Care and Evaluation    Patient location during evaluation: bedside  Patient participation: complete - patient participated  Level of consciousness: awake  Pain management: adequate  Airway patency: patent  Anesthetic complications: No anesthetic complications    Cardiovascular status: acceptable  Respiratory status: acceptable  Hydration status: acceptable    Comments: */73 (BP Location: Left arm, Patient Position: Sitting)   Pulse 67   Temp 36.6 °C (97.8 °F) (Oral)   Resp 16   Ht 152.4 cm (60\")   Wt 86.4 kg (190 lb 9 oz)   SpO2 99%   BMI 37.22 kg/m²         "

## 2020-01-14 NOTE — DISCHARGE INSTRUCTIONS
"After the surgery you may:  -Shower  -Walk around the house or no normal activity (light exercise).  Listen to your body and, when tired, stop or rest.  You will have less energy and feel more \"dizzyness\" in the weeks after surgery than is normal for you.  -Drive in 1-2 weeks when you are off pain medication that has narcotics (like Percocet or Vicodin)  -Go up and down stairs  -Lift normal household objects or light people (gallon of milk, laundry basket, tools, infant or toddler)    Please do NOT:  -Place anything in the vagina for 6 weeks after surgery  -Lift >50 pounds (furniture, heavy dogs, or other adult people)    Please contact physician if any of the following  -Nausea/vomitng  -Lack of gas or BM for >24 hours; keep in mind may take 5-7 days after surgery to have bowel movement.  May take 17 grams miralax nightly (safe to take nightly) or dulcolax 10 mg by mouth if that does not help with hard stool or constipation.  -Severe burning or urgency of urination or blood in urine  -Temperature >100 degrees Fahrenheit.  -Severe vaginal bleeding (>1 pad/hour)  -Pain that is not relieved by your regular pain medications  -Fainting or \"passing out\"  -Severe chest pain or shortness of breath with minimal activity    We are always available in the clinic during daytime hours [phone] or during nights and weekends on the answering service [phone].  There is always a doctor on call available to help you who will know the type of surgery you had and how to address your problem.       SEDATION DISCHARGE INSTRUCTIONS.    IMPORTANT: The following information will help you return to your best level of health.  GENERAL ANESTHESIA.  You have had general anesthesia. You were given a medicine to help you go to sleep and not feel pain.    Follow these instructions:   Go right home. Rest quietly at home today, then you can be up and about.   Do not drink alcohol, drive or operate machinery for 24 hours.   Do not make " any important decisions or sign any legal papers in the next 24 hours.   Have a RESPONSIBLE PERSON stay with you the rest of today and overnight for your protection and safety.   Start your diet with fluids and light foods (jello, soup, juice, toast). Then eat a normal diet if not nauseated.    Call your doctor if you have:   any blue or gray skin color.   repeated vomiting.   trouble breathing.   any new problems or concerns.

## 2020-01-14 NOTE — ANESTHESIA PROCEDURE NOTES
Airway  Urgency: elective    Date/Time: 1/14/2020 7:39 AM  Airway not difficult    General Information and Staff    Patient location during procedure: OR  Anesthesiologist: Rudolph Martinez MD  CRNA: Bal Rojas CRNA    Indications and Patient Condition  Indications for airway management: airway protection    Preoxygenated: yes  MILS not maintained throughout  Mask difficulty assessment: 1 - vent by mask    Final Airway Details  Final airway type: supraglottic airway      Successful airway: unique and LMA  Size 4    Number of attempts at approach: 1  Assessment: lips, teeth, and gum same as pre-op    Additional Comments  Pre O2, SIAI

## 2020-09-28 ENCOUNTER — PREP FOR SURGERY (OUTPATIENT)
Dept: OTHER | Facility: HOSPITAL | Age: 75
End: 2020-09-28

## 2020-09-28 ENCOUNTER — OFFICE VISIT (OUTPATIENT)
Dept: GASTROENTEROLOGY | Facility: CLINIC | Age: 75
End: 2020-09-28

## 2020-09-28 VITALS
HEART RATE: 87 BPM | TEMPERATURE: 98.6 F | HEIGHT: 60 IN | SYSTOLIC BLOOD PRESSURE: 118 MMHG | BODY MASS INDEX: 37.44 KG/M2 | OXYGEN SATURATION: 97 % | DIASTOLIC BLOOD PRESSURE: 72 MMHG | WEIGHT: 190.7 LBS

## 2020-09-28 DIAGNOSIS — K21.9 GASTROESOPHAGEAL REFLUX DISEASE WITHOUT ESOPHAGITIS: Primary | ICD-10-CM

## 2020-09-28 DIAGNOSIS — R13.19 ESOPHAGEAL DYSPHAGIA: ICD-10-CM

## 2020-09-28 DIAGNOSIS — K59.04 CHRONIC IDIOPATHIC CONSTIPATION: ICD-10-CM

## 2020-09-28 PROCEDURE — 99214 OFFICE O/P EST MOD 30 MIN: CPT | Performed by: NURSE PRACTITIONER

## 2020-09-28 RX ORDER — FAMOTIDINE 40 MG/1
40 TABLET, FILM COATED ORAL DAILY
Qty: 90 TABLET | Refills: 1 | Status: SHIPPED | OUTPATIENT
Start: 2020-09-28 | End: 2021-12-28

## 2020-09-28 RX ORDER — PANTOPRAZOLE SODIUM 40 MG/1
40 TABLET, DELAYED RELEASE ORAL EVERY MORNING
Qty: 90 TABLET | Refills: 3 | Status: SHIPPED | OUTPATIENT
Start: 2020-09-28 | End: 2021-12-28 | Stop reason: SDUPTHER

## 2020-09-28 RX ORDER — IPRATROPIUM BROMIDE 42 UG/1
SPRAY, METERED NASAL
COMMUNITY
Start: 2020-08-19

## 2020-09-28 RX ORDER — SUCRALFATE 1 G/1
1 TABLET ORAL 2 TIMES DAILY
Qty: 180 TABLET | Refills: 1 | Status: SHIPPED | OUTPATIENT
Start: 2020-09-28

## 2020-09-28 NOTE — PATIENT INSTRUCTIONS
Acid reflux, worsening.  Continue pantoprazole 40 mg daily, refill sent electronically to pharmacy.    Acid reflux, worsening, start famotidine 40 mg 1 tablet with dinner.    Acid reflux, worsening.  Start sucralfate crushed in 1 inch of water twice daily.  Make sure to take your last dose of the day before bedtime.    For acid reflux, strongly recommend dietary and lifestyle modifications including smaller more frequent meals, avoiding foods that worsen symptoms and elevating head of the bed while sleeping.  Written handout provided regarding dietary lifestyle changes for acid reflux.    For trouble swallowing, use liquid wash and peanut butter cracker to help with pills.  Will schedule EGD for evaluation of worsening acid reflux and trouble swallowing, orders placed.    For constipation, samples of Linzess provided.  Take 1 pill 30 minutes before first meal of the day.  Use once a week to see if this provides more complete emptying then Senokot as discussed.  If this provides improvement and as tolerated, please contact the office and we can send in a prescription.    Family history of colorectal cancer and personal history of colon polyps recommended colonoscopy in 5 years, June 2022, this has been placed in recall.    Schedule follow-up telephone visit 7 to 14 days after EGD to review EGD results and findings and discuss acid reflux medication regimen.

## 2020-09-28 NOTE — PROGRESS NOTES
Chief Complaint   Patient presents with   • Follow-up     med refill pantoprazole    • Constipation     has vanessa daiglena has not worked        HPI  Patient presents today for follow-up.  She has a history of constipation and rectal bleeding.  She was a previous patient of Dr. George with a history of chronic obstipation, GERD, ileal stenosis, fecal soiling and bloating.  Also family history of colon cancer in her mother at age 74.       She has complaints of acid reflux and trouble swallowing.  This has worsened over the past couple of months.  She has been on pantoprazole 40 mg daily and will experience nocturnal reflux with acid and vomit in her mouth once every 10 to 14 days.  She tries to sleep with the head of the bed elevated.  She does not follow reflux diet.  She does not notice any pattern in these episodes based on eating a larger meal or size of meal or timing of meal.    She has epigastric burning and reflux that is worse later in the day.  This occurs with any foods.  She takes her pantoprazole in the morning and it works for several hours but does not last.    She has difficulty swallowing large pills and has to use a liquid wash or peanut butter cracker.  This is new and has started over the past several months.  This occurs 5 out of 7 days a week.  No difficulty with liquids but does have some trouble swallowing solid foods at times.  No episodes of forced regurgitation.    Denies NSAID use.  Denies weight gain.    Her weight is stable.    Previous treatment for acid reflux include omeprazole.    She was a previous patient of Dr. George and a review of her chart shows last EGD in 2011 however the operative report is not available.  There was also mention in her previous records of a history of H. pylori but again the pathology is not available from that report.    She has complaints of constipation and due to her schedule she is limited in taking medications regularly for constipation.  She  currently takes 7 Senokot once a week and this causes incomplete evacuation but she will have a bowel movement.  If she does not take Senokot she can go up to 2 weeks without having a bowel movement.  She denies melena or hematochezia.    She is continuing to follow with Dr. Spencer regarding urinary complaints.  She has had numerous gynecological and bladder surgeries most recently January 2020 with a bladder sling placement.  She has not noticed improvement in constipation since surgery.    Previous treatments include Linzess, lactulose and Amitiza as well as enemas.  Use of Linzess was unpredictable and caused urgency.  Previous use of probiotic worsened gas and bloating.  She also has used Senokot and motegrity.    Initially she had improvement in longstanding history of constipation with placement of sacral nerve stimulator for bladder complaints however she still experiences incomplete evacuation.    REVIEW OF PREVIOUS RECORDS:  June 1, 2017 colonoscopy for hematochezia and constipation.  Bowel prep was good.  Perianal and digital rectal exam normal, multiple small mouth diverticula found in the sigmoid colon.  No evidence of stenosis of the ileocecal valve.  Hepatic flexure polyp consistent with tubular adenoma recommended for colonoscopy in 5 years, June 2022.    Defecating proctogram April 2017.  Small anterior rectocele with complete rectal evacuation.  Mild rectal incontinence more pronounced in the sitting position.  Anal rectal junction appears relatively low lying in neutral position.  Mild elevation of the puborectalis during squeeze maneuver.    Anal rectal manometry April 18, 2017.  Normal resting anal canal pressure.  Low squeeze pressure.  For sensation in the rectal vault was impaired.  Desire to defecate was impaired.  Maximum tolerance volume in the rectum was normal.  Rectal anal inhibitory reflex was complete.  Consistent with weak internal/external sphincter.  Consistent with impaired rectal  sensation.  Consistent with pelvic floor disorder.  No defecation stimulation reported.  Recommendations for pelvic floor retraining and questionable benefit of sacral stimulator.    Review of Systems   Constitutional: Negative.    HENT: Negative.    Eyes: Negative.    Respiratory: Positive for wheezing.    Cardiovascular: Negative.    Gastrointestinal: Positive for constipation.   Endocrine: Positive for polydipsia.   Genitourinary: Positive for frequency.   Musculoskeletal: Positive for back pain.   Skin: Negative.    Allergic/Immunologic: Positive for environmental allergies and food allergies.   Hematological: Negative.    Psychiatric/Behavioral: Negative.         Problem List:    Patient Active Problem List   Diagnosis   (none) - all problems resolved or deleted       Medical History:    Past Medical History:   Diagnosis Date   • Anxiety and depression    • Aortic root enlargement (CMS/Formerly [facility])    • Aortic valve insufficiency    • Arthritis    • Asthma    • Bladder spasms    • Blepharitis of both eyes    • Bone spur     LUMBAR   • Chronic constipation    • COPD (chronic obstructive pulmonary disease) (CMS/Formerly [facility])    • Diverticular disease    • Dry eyes    • Facial paresthesia 11/2018    HISTORY OF    • Fecal incontinence     AT TIMES   • GERD (gastroesophageal reflux disease)    • Hashimoto's thyroiditis    • History of fracture of nasal bone 07/22/2019    IN 3 PLACES AFTER FALL   • Hyperlipidemia    • MVP (mitral valve prolapse)    • RLS (restless legs syndrome)    • Seasonal allergies    • Sleep apnea     USES C-PAP   • Stress incontinence     WEARS PADS   • Tinnitus     BOTH EARS AT TIMES   • Urinary incontinence    • Vaginal prolapse         Social History:    Social History     Socioeconomic History   • Marital status:      Spouse name: Not on file   • Number of children: Not on file   • Years of education: Not on file   • Highest education level: Not on file   Tobacco Use   • Smoking status: Never Smoker    • Smokeless tobacco: Never Used   • Tobacco comment: caffeine use    Substance and Sexual Activity   • Alcohol use: No   • Drug use: Never   • Sexual activity: Defer       Family History:   Family History   Problem Relation Age of Onset   • Heart attack Mother    • Stroke Mother    • Colon cancer Mother    • Malig Hyperthermia Neg Hx    • Colon polyps Neg Hx    • Crohn's disease Neg Hx    • Irritable bowel syndrome Neg Hx    • Ulcerative colitis Neg Hx        Surgical History:   Past Surgical History:   Procedure Laterality Date   • BLADDER SURGERY  1980 & 2003   • BREAST BIOPSY Left 1969   • BREAST BIOPSY Right 1979   • BREAST CYST EXCISION Left 1985   • BREAST DUCT EXCISION Left 2005   • CARDIAC CATHETERIZATION     • CATARACT EXTRACTION W/ INTRAOCULAR LENS IMPLANT Bilateral     • COLONOSCOPY  2017   • CYSTOSCOPY BOTOX INJECTION OF BLADDER      X 2   • HYSTERECTOMY  1985   • INTERSTIM PERC TEST  10/17/2017    DR. BRYAN BARTON   • INTERSTIM PLACEMENT  10/31/2017    DR. BRYAN BARTON   • PUBOVAGINAL SLING N/A 1/14/2020    Procedure: RETROPUBIC MID URATHRAL  SLING CYSTOCOPY;  Surgeon: Bryan Barton MD;  Location: [facility];  Service: Gynecology   • SACROSPINUS SUSPENSION  02/01/2018   • THYROID SURGERY Right 07/27/2017    NODULES REMOVED   • TONSILLECTOMY  1950   • UPPER GASTROINTESTINAL ENDOSCOPY  ?         Current Outpatient Medications:   •  albuterol (PROVENTIL HFA) 108 (90 BASE) MCG/ACT inhaler, Inhale 2 puffs Every 4 (Four) Hours As Needed., Disp: , Rfl:   •  albuterol (PROVENTIL) (2.5 MG/3ML) 0.083% nebulizer solution, Take 2.5 mg by nebulization Every 4 (Four) Hours As Needed., Disp: , Rfl:   •  Ascorbic Acid (VITAMIN C) 500 MG capsule, Take 2 tablets by mouth 2 (two) times a day., Disp: , Rfl:   •  aspirin 81 MG EC tablet, Take 81 mg by mouth Daily. WILL CHECK WITH DR. BARTON TO SEE WHEN SHE IS TO STOP ASPIRIN, Disp: , Rfl:   •  budesonide-formoterol (SYMBICORT) 80-4.5 MCG/ACT  inhaler, Inhale 2 puffs 2 (two) times a day., Disp: , Rfl:   •  buPROPion SR (WELLBUTRIN SR) 150 MG 12 hr tablet, Take 150 mg by mouth Every Morning., Disp: , Rfl:   •  calcium carbonate-cholecalciferol 500-400 MG-UNIT tablet tablet, Take  by mouth 2 (Two) Times a Day., Disp: , Rfl:   •  Cholecalciferol (VITAMIN D3) 2000 UNITS capsule, Take 2,000 Units by mouth 2 (Two) Times a Day., Disp: , Rfl:   •  citalopram (CeleXA) 40 MG tablet, Take 40 mg by mouth Every Morning., Disp: , Rfl:   •  Coenzyme Q10 (COQ-10 PO), Take 1 tablet by mouth Daily., Disp: , Rfl:   •  Dextromethorphan-Guaifenesin (MUCINEX DM MAXIMUM STRENGTH)  MG tablet sustained-release 12 hour, Take 1 capsule by mouth As Needed., Disp: , Rfl:   •  fexofenadine (ALLEGRA) 180 MG tablet, Take 180 mg by mouth Daily., Disp: , Rfl:   •  gabapentin (NEURONTIN) 300 MG capsule, Take 300 mg by mouth Every Evening., Disp: , Rfl:   •  ibuprofen (ADVIL,MOTRIN) 600 MG tablet, Take 1 tablet by mouth Every 6 (Six) Hours As Needed for Mild Pain  or Moderate Pain ., Disp: 20 tablet, Rfl: 0  •  ipratropium (ATROVENT) 0.06 % nasal spray, USE 1 TO 2 SPRAY(S) IN EACH NOSTRIL ONCE DAILY TO THREE TIMES DAILY, Disp: , Rfl:   •  levothyroxine (SYNTHROID, LEVOTHROID) 25 MCG tablet, Take 25 mcg by mouth Daily., Disp: , Rfl:   •  Magnesium 500 MG capsule, Take 500 mg by mouth Every Morning., Disp: , Rfl:   •  Mirabegron ER (MYRBETRIQ) 50 MG tablet sustained-release 24 hour 24 hr tablet, Take 50 mg by mouth Every Morning., Disp: , Rfl:   •  montelukast (SINGULAIR) 10 MG tablet, Take 10 mg by mouth Every Night., Disp: , Rfl:   •  Omega-3 Fatty Acids (OMEGA-3 FISH OIL PO), Take 1 capsule by mouth Every Morning., Disp: , Rfl:   •  pantoprazole (PROTONIX) 40 MG EC tablet, Take 1 tablet by mouth Every Morning., Disp: 90 tablet, Rfl: 3  •  polyvinyl alcohol-povidone (REFRESH) 1.4-0.6 % ophthalmic solution, Administer 1 drop to both eyes As Needed., Disp: , Rfl:   •  pravastatin  (PRAVACHOL) 40 MG tablet, Take 40 mg by mouth Every Night., Disp: , Rfl:   •  Pyridoxine HCl (VITAMIN B-6 PO), Take 1 tablet by mouth Daily., Disp: , Rfl:   •  raloxifene (EVISTA) 60 MG tablet, Take 60 mg by mouth Daily., Disp: , Rfl:   •  senna (SENOKOT) 8.6 MG tablet tablet, Take 1 tablet by mouth Every Night., Disp: , Rfl:   •  sodium chloride 0.65 % nasal spray, 1 spray into the nostril(s) as directed by provider As Needed for Congestion., Disp: , Rfl:   •  Triamcinolone Acetonide (NASACORT) 55 MCG/ACT nasal inhaler, 2 sprays into the nostril(s) as directed by provider Daily., Disp: , Rfl:   •  vitamin B-12 (CYANOCOBALAMIN) 250 MCG tablet, Take 250 mcg by mouth Daily., Disp: , Rfl:   •  Vitamin E 400 UNITS tablet, Take 400 Units by mouth Daily., Disp: , Rfl:   •  docusate sodium (COLACE) 100 MG capsule, Take 1 capsule by mouth Daily As Needed for Constipation., Disp: 30 capsule, Rfl: 1  •  famotidine (PEPCID) 40 MG tablet, Take 1 tablet by mouth Daily., Disp: 90 tablet, Rfl: 1  •  linaclotide (Linzess) 72 MCG capsule capsule, Take 1 capsule by mouth Every Morning Before Breakfast., Disp: 14 capsule, Rfl: 0  •  sucralfate (CARAFATE) 1 g tablet, Take 1 tablet by mouth 2 (Two) Times a Day. Crush in one inch of water., Disp: 180 tablet, Rfl: 1    Allergies:  Patient has no known allergies.    The following portions of the patient's history were reviewed and updated as appropriate: allergies, current medications, past family history, past medical history, past social history, past surgical history and problem list.    Vitals:    09/28/20 0943   BP: 118/72   Pulse: 87   Temp: 98.6 °F (37 °C)   SpO2: 97%         09/28/20 0943   Weight: 86.5 kg (190 lb 11.2 oz)     Body mass index is 37.24 kg/m².    Physical Exam  Vitals signs reviewed.   Constitutional:       General: She is not in acute distress.     Appearance: Normal appearance. She is obese. She is not ill-appearing.   Neck:      Musculoskeletal: Normal range of  motion and neck supple. No neck rigidity or muscular tenderness.   Pulmonary:      Effort: Pulmonary effort is normal. No respiratory distress.      Breath sounds: Normal breath sounds. No wheezing or rhonchi.   Abdominal:      General: Bowel sounds are normal. There is no distension.      Palpations: Abdomen is soft. Abdomen is not rigid. There is no pulsatile mass.      Tenderness: There is no abdominal tenderness. There is no guarding or rebound.   Lymphadenopathy:      Cervical: No cervical adenopathy.   Neurological:      Mental Status: She is alert.           Assessment/ Plan  Valerie was seen today for follow-up and constipation.    Diagnoses and all orders for this visit:    Gastroesophageal reflux disease without esophagitis  -     famotidine (PEPCID) 40 MG tablet; Take 1 tablet by mouth Daily.  -     sucralfate (CARAFATE) 1 g tablet; Take 1 tablet by mouth 2 (Two) Times a Day. Crush in one inch of water.    Esophageal dysphagia  -     famotidine (PEPCID) 40 MG tablet; Take 1 tablet by mouth Daily.  -     sucralfate (CARAFATE) 1 g tablet; Take 1 tablet by mouth 2 (Two) Times a Day. Crush in one inch of water.    Chronic idiopathic constipation  -     linaclotide (Linzess) 72 MCG capsule capsule; Take 1 capsule by mouth Every Morning Before Breakfast.    Other orders  -     pantoprazole (PROTONIX) 40 MG EC tablet; Take 1 tablet by mouth Every Morning.         Return for After procedures, telephone visit with JAROD.    Patient Instructions   Acid reflux, worsening.  Continue pantoprazole 40 mg daily, refill sent electronically to pharmacy.    Acid reflux, worsening, start famotidine 40 mg 1 tablet with dinner.    Acid reflux, worsening.  Start sucralfate crushed in 1 inch of water twice daily.  Make sure to take your last dose of the day before bedtime.    For acid reflux, strongly recommend dietary and lifestyle modifications including smaller more frequent meals, avoiding foods that worsen symptoms and elevating  head of the bed while sleeping.  Written handout provided regarding dietary lifestyle changes for acid reflux.    For trouble swallowing, use liquid wash and peanut butter cracker to help with pills.  Will schedule EGD for evaluation of worsening acid reflux and trouble swallowing, orders placed.    For constipation, samples of Linzess provided.  Take 1 pill 30 minutes before first meal of the day.  Use once a week to see if this provides more complete emptying then Senokot as discussed.  If this provides improvement and as tolerated, please contact the office and we can send in a prescription.    Family history of colorectal cancer and personal history of colon polyps recommended colonoscopy in 5 years, June 2022, this has been placed in recall.    Schedule follow-up telephone visit 7 to 14 days after EGD to review EGD results and findings and discuss acid reflux medication regimen.           Discussion:  Patient with worsening acid reflux and trouble swallowing.  She states she has had esophageal dilation many years ago and this helps with trouble swallowing.  Given worsening reflux, will continue pantoprazole as this provides improvement but does not last the entire day.  Will add famotidine and sucralfate.  Written information regarding dietary and lifestyle modifications for acid reflux was provided and strongly encouraged including elevating head of the bed as she has a mechanical bed that can be elevated.    Constipation has been a longstanding problem and is multifocal.  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  If Linzess causes more complete evacuation and then Senokot, patient is agreeable to contact the office and we will send in a new prescription.    Placed in recall for colonoscopy.    And will arrange for follow-up visit via telephone or  tele-med after EGD to discuss results and determine medication regimen based on EGD findings and clinical course.

## 2020-10-08 ENCOUNTER — APPOINTMENT (OUTPATIENT)
Dept: WOMENS IMAGING | Facility: HOSPITAL | Age: 75
End: 2020-10-08

## 2020-10-08 PROCEDURE — 77067 SCR MAMMO BI INCL CAD: CPT | Performed by: RADIOLOGY

## 2020-10-08 PROCEDURE — 77063 BREAST TOMOSYNTHESIS BI: CPT | Performed by: RADIOLOGY

## 2020-11-02 ENCOUNTER — LAB REQUISITION (OUTPATIENT)
Dept: LAB | Facility: HOSPITAL | Age: 75
End: 2020-11-02

## 2020-11-02 DIAGNOSIS — Z00.00 ENCOUNTER FOR GENERAL ADULT MEDICAL EXAMINATION WITHOUT ABNORMAL FINDINGS: ICD-10-CM

## 2020-11-03 PROCEDURE — U0004 COV-19 TEST NON-CDC HGH THRU: HCPCS | Performed by: INTERNAL MEDICINE

## 2020-11-04 LAB — SARS-COV-2 RNA RESP QL NAA+PROBE: NOT DETECTED

## 2020-11-05 ENCOUNTER — LAB REQUISITION (OUTPATIENT)
Dept: LAB | Facility: HOSPITAL | Age: 75
End: 2020-11-05

## 2020-11-05 ENCOUNTER — OUTSIDE FACILITY SERVICE (OUTPATIENT)
Dept: GASTROENTEROLOGY | Facility: CLINIC | Age: 75
End: 2020-11-05

## 2020-11-05 DIAGNOSIS — K21.9 GASTRO-ESOPHAGEAL REFLUX DISEASE WITHOUT ESOPHAGITIS: ICD-10-CM

## 2020-11-05 PROCEDURE — 88305 TISSUE EXAM BY PATHOLOGIST: CPT | Performed by: INTERNAL MEDICINE

## 2020-11-05 PROCEDURE — 88104 CYTOPATH FL NONGYN SMEARS: CPT | Performed by: INTERNAL MEDICINE

## 2020-11-05 PROCEDURE — 43239 EGD BIOPSY SINGLE/MULTIPLE: CPT | Performed by: INTERNAL MEDICINE

## 2020-11-06 DIAGNOSIS — R13.10 DYSPHAGIA, UNSPECIFIED TYPE: Primary | ICD-10-CM

## 2020-11-06 DIAGNOSIS — R05.9 COUGH: ICD-10-CM

## 2020-11-06 LAB
CYTO UR: NORMAL
CYTO UR: NORMAL
LAB AP CASE REPORT: NORMAL
LAB AP CASE REPORT: NORMAL
LAB AP CLINICAL INFORMATION: NORMAL
PATH REPORT.FINAL DX SPEC: NORMAL
PATH REPORT.FINAL DX SPEC: NORMAL
PATH REPORT.GROSS SPEC: NORMAL
PATH REPORT.GROSS SPEC: NORMAL

## 2020-11-10 ENCOUNTER — TRANSCRIBE ORDERS (OUTPATIENT)
Dept: ADMINISTRATIVE | Facility: HOSPITAL | Age: 75
End: 2020-11-10

## 2020-11-10 DIAGNOSIS — Z01.818 OTHER SPECIFIED PRE-OPERATIVE EXAMINATION: Primary | ICD-10-CM

## 2020-11-12 ENCOUNTER — OFFICE VISIT (OUTPATIENT)
Dept: GASTROENTEROLOGY | Facility: CLINIC | Age: 75
End: 2020-11-12

## 2020-11-12 DIAGNOSIS — R13.10 DYSPHAGIA, UNSPECIFIED TYPE: Primary | ICD-10-CM

## 2020-11-12 DIAGNOSIS — K21.9 GASTROESOPHAGEAL REFLUX DISEASE WITHOUT ESOPHAGITIS: ICD-10-CM

## 2020-11-12 DIAGNOSIS — K59.04 CHRONIC IDIOPATHIC CONSTIPATION: ICD-10-CM

## 2020-11-12 PROCEDURE — 99443 PR PHYS/QHP TELEPHONE EVALUATION 21-30 MIN: CPT | Performed by: NURSE PRACTITIONER

## 2020-11-12 NOTE — PROGRESS NOTES
Chief Complaint   Patient presents with   • Heartburn     medication follow up       HPI  Presents today for follow-up via telephone after recent EGD.  This was completed November 5, 2020, results summarized below.  Patient is followed by this office with a history of constipation, rectal bleeding, ileal stenosis, fecal soiling and bloating.  Also family history of colon cancer in her mother at age 74.  For constipation patient has had defecating proctogram and anal rectal manometry April 2017.    At her last visit September 2020 patient was having difficulty swallowing large pills and was requiring a liquid wash or peanut butter cracker to help with passage.  No difficulty with liquids but some trouble swallowing solid foods at times.  No episodes of forced regurgitation.  SHe is scheduled for video swallow study next week.  Since her EGD trouble swallowing has improved.  She has not had any difficulty with pills.  She was having symptoms of trouble swallowing prior to her EGD.  We added Pepcid and sucralfate for worsening reflux at her last visit September 2020.    She is on pantoprazole 40 mg daily and can experience nocturnal reflux with acid in her mouth at times.  This has lessened since we added Pepcid and sucralfate.  She tries to sleep with the head of the bed elevated.  She does not follow reflux diet.    Previous treatments for reflux include omeprazole.    She has complaints of constipation and due to her schedule she is limited in taking medications regularly for constipation.  She was taking Senokot up to 8 tablets once a week with incomplete evacuation but she would have a predictable bowel movement.  In the past if she does not take Senokot she can go up to 2 weeks without having a spontaneous bowel movement.  She was given samples of Linzess 72 mcg at her last visit.  She has been taking these once a week and has a bowel movement when she takes them with urgency but does not feel empty.  Again her  schedule limits how often she can take the medication as she does not have regular access to a restroom.  She is wondering if she can take Linzess more regularly.  She has not tried Linzess and Senokot together.      Previous treatments for constipation include higher doses of Linzess Linzess, (unpredictable and caused urgency), lactulose and Amitiza as well as enemas.  She has also used Senokot and Motegrity.    Initially she had improvement in longstanding complaints of constipation with placement of sacral nerve stimulator for bladder issues however she will still experience incomplete evacuation.    She continues to follow with Dr. Spencer regarding urinary complaints.  She has had numerous gynecological and bladder surgeries most recent January 2020 with bladder sling placement.  She has not noticed improvement in constipation since surgery.      REVIEW OF PREVIOUS RECORDS:  November 5, 2020 EGD.  Scattered possibly white patchy plaques found in the mid esophagus, esophageal brushings negative for Candida.  Large hiatal hernia.  Diffuse mildly erythematous mucosa in the stomach biopsies consistent with minimal and focal chronic inflammation and intestinal metaplasia with no dysplasia.  No H. pylori.  No evidence of esophagitis, ring or luminal narrowing.    June 1, 2017 colonoscopy for hematochezia and constipation.  Bowel prep was good.  Perianal and digital rectal exam normal, multiple small mouth diverticula found in the sigmoid colon.  No evidence of stenosis of the ileocecal valve.  Hepatic flexure polyp consistent with tubular adenoma recommended for colonoscopy in 5 years, June 2022.     Defecating proctogram April 2017.  Small anterior rectocele with complete rectal evacuation.  Mild rectal incontinence more pronounced in the sitting position.  Anal rectal junction appears relatively low lying in neutral position.  Mild elevation of the puborectalis during squeeze maneuver.     Anal rectal manometry  April 18, 2017.  Normal resting anal canal pressure.  Low squeeze pressure.  For sensation in the rectal vault was impaired.  Desire to defecate was impaired.  Maximum tolerance volume in the rectum was normal.  Rectal anal inhibitory reflex was complete.  Consistent with weak internal/external sphincter.  Consistent with impaired rectal sensation.  Consistent with pelvic floor disorder.  No defecation stimulation reported.  Recommendations for pelvic floor retraining and questionable benefit of sacral stimulator.        You have chosen to receive care through a telephone visit today. Do you consent to use a telephone visit for your medical care today? Yes      Review of Systems   Constitutional: Negative for appetite change, chills, diaphoresis, fatigue, fever and unexpected weight change.   HENT: Negative for dental problem, ear pain, mouth sores, rhinorrhea, sore throat and voice change.    Eyes: Negative for pain, redness and visual disturbance.   Respiratory: Negative for cough, chest tightness and wheezing.    Cardiovascular: Negative for chest pain, palpitations and leg swelling.   Endocrine: Negative for cold intolerance, heat intolerance, polydipsia, polyphagia and polyuria.   Genitourinary: Negative for dysuria, frequency, hematuria and urgency.   Musculoskeletal: Negative for arthralgias, back pain, joint swelling, myalgias and neck pain.   Skin: Negative for rash.   Allergic/Immunologic: Negative for environmental allergies, food allergies and immunocompromised state.   Neurological: Negative for dizziness, seizures, weakness, numbness and headaches.   Hematological: Does not bruise/bleed easily.   Psychiatric/Behavioral: Negative for sleep disturbance. The patient is not nervous/anxious.        Problem List:    Patient Active Problem List   Diagnosis   • Chronic idiopathic constipation   • Dysphagia       Medical History:    Past Medical History:   Diagnosis Date   • Anxiety and depression    • Aortic  root enlargement (CMS/HCC)    • Aortic valve insufficiency    • Arthritis    • Asthma    • Bladder spasms    • Blepharitis of both eyes    • Bone spur     LUMBAR   • Chronic constipation    • COPD (chronic obstructive pulmonary disease) (CMS/HCC)    • Diverticular disease    • Dry eyes    • Facial paresthesia 11/2018    HISTORY OF    • Fecal incontinence     AT TIMES   • GERD (gastroesophageal reflux disease)    • Hashimoto's thyroiditis    • History of fracture of nasal bone 07/22/2019    IN 3 PLACES AFTER FALL   • Hyperlipidemia    • MVP (mitral valve prolapse)    • RLS (restless legs syndrome)    • Seasonal allergies    • Sleep apnea     USES C-PAP   • Stress incontinence     WEARS PADS   • Tinnitus     BOTH EARS AT TIMES   • Urinary incontinence    • Vaginal prolapse         Social History:    Social History     Socioeconomic History   • Marital status:      Spouse name: Not on file   • Number of children: Not on file   • Years of education: Not on file   • Highest education level: Not on file   Tobacco Use   • Smoking status: Never Smoker   • Smokeless tobacco: Never Used   • Tobacco comment: caffeine use    Substance and Sexual Activity   • Alcohol use: No   • Drug use: Never   • Sexual activity: Defer       Family History:   Family History   Problem Relation Age of Onset   • Heart attack Mother    • Stroke Mother    • Colon cancer Mother    • Malig Hyperthermia Neg Hx    • Colon polyps Neg Hx    • Crohn's disease Neg Hx    • Irritable bowel syndrome Neg Hx    • Ulcerative colitis Neg Hx        Surgical History:   Past Surgical History:   Procedure Laterality Date   • BLADDER SURGERY  1980 & 2003   • BREAST BIOPSY Left 1969   • BREAST BIOPSY Right 1979   • BREAST CYST EXCISION Left 1985   • BREAST DUCT EXCISION Left 2005   • CARDIAC CATHETERIZATION     • CATARACT EXTRACTION W/ INTRAOCULAR LENS IMPLANT Bilateral     • COLONOSCOPY  2017   • CYSTOSCOPY BOTOX INJECTION OF BLADDER      X 2    • HYSTERECTOMY  1985   • INTERSTIM PERC TEST  10/17/2017    DR. BRYAN BARTON   • INTERSTIM PLACEMENT  10/31/2017    DR. BRYAN BARTON   • PUBOVAGINAL SLING N/A 1/14/2020    Procedure: RETROPUBIC MID URATHRAL  SLING CYSTOCOPY;  Surgeon: Bryan Barton MD;  Location: [facility];  Service: Gynecology   • SACROSPINUS SUSPENSION  02/01/2018   • THYROID SURGERY Right 07/27/2017    NODULES REMOVED   • TONSILLECTOMY  1950   • UPPER GASTROINTESTINAL ENDOSCOPY  ?         Current Outpatient Medications:   •  albuterol (PROVENTIL HFA) 108 (90 BASE) MCG/ACT inhaler, Inhale 2 puffs Every 4 (Four) Hours As Needed., Disp: , Rfl:   •  albuterol (PROVENTIL) (2.5 MG/3ML) 0.083% nebulizer solution, Take 2.5 mg by nebulization Every 4 (Four) Hours As Needed., Disp: , Rfl:   •  Ascorbic Acid (VITAMIN C) 500 MG capsule, Take 2 tablets by mouth 2 (two) times a day., Disp: , Rfl:   •  aspirin 81 MG EC tablet, Take 81 mg by mouth Daily. WILL CHECK WITH DR. BARTON TO SEE WHEN SHE IS TO STOP ASPIRIN, Disp: , Rfl:   •  budesonide-formoterol (SYMBICORT) 80-4.5 MCG/ACT inhaler, Inhale 2 puffs 2 (two) times a day., Disp: , Rfl:   •  buPROPion SR (WELLBUTRIN SR) 150 MG 12 hr tablet, Take 150 mg by mouth Every Morning., Disp: , Rfl:   •  calcium carbonate-cholecalciferol 500-400 MG-UNIT tablet tablet, Take  by mouth 2 (Two) Times a Day., Disp: , Rfl:   •  Cholecalciferol (VITAMIN D3) 2000 UNITS capsule, Take 2,000 Units by mouth 2 (Two) Times a Day., Disp: , Rfl:   •  citalopram (CeleXA) 40 MG tablet, Take 40 mg by mouth Every Morning., Disp: , Rfl:   •  Coenzyme Q10 (COQ-10 PO), Take 1 tablet by mouth Daily., Disp: , Rfl:   •  Dextromethorphan-Guaifenesin (MUCINEX DM MAXIMUM STRENGTH)  MG tablet sustained-release 12 hour, Take 1 capsule by mouth As Needed., Disp: , Rfl:   •  famotidine (PEPCID) 40 MG tablet, Take 1 tablet by mouth Daily., Disp: 90 tablet, Rfl: 1  •  fexofenadine (ALLEGRA) 180 MG tablet, Take 180 mg by mouth Daily.,  Disp: , Rfl:   •  gabapentin (NEURONTIN) 300 MG capsule, Take 300 mg by mouth Every Evening., Disp: , Rfl:   •  ibuprofen (ADVIL,MOTRIN) 600 MG tablet, Take 1 tablet by mouth Every 6 (Six) Hours As Needed for Mild Pain  or Moderate Pain ., Disp: 20 tablet, Rfl: 0  •  ipratropium (ATROVENT) 0.06 % nasal spray, USE 1 TO 2 SPRAY(S) IN EACH NOSTRIL ONCE DAILY TO THREE TIMES DAILY, Disp: , Rfl:   •  levothyroxine (SYNTHROID, LEVOTHROID) 25 MCG tablet, Take 25 mcg by mouth Daily., Disp: , Rfl:   •  linaclotide (Linzess) 72 MCG capsule capsule, Take 1 capsule by mouth Every Morning Before Breakfast., Disp: 14 capsule, Rfl: 0  •  Magnesium 500 MG capsule, Take 500 mg by mouth Every Morning., Disp: , Rfl:   •  Mirabegron ER (MYRBETRIQ) 50 MG tablet sustained-release 24 hour 24 hr tablet, Take 50 mg by mouth Every Morning., Disp: , Rfl:   •  montelukast (SINGULAIR) 10 MG tablet, Take 10 mg by mouth Every Night., Disp: , Rfl:   •  Omega-3 Fatty Acids (OMEGA-3 FISH OIL PO), Take 1 capsule by mouth Every Morning., Disp: , Rfl:   •  pantoprazole (PROTONIX) 40 MG EC tablet, Take 1 tablet by mouth Every Morning., Disp: 90 tablet, Rfl: 3  •  polyvinyl alcohol-povidone (REFRESH) 1.4-0.6 % ophthalmic solution, Administer 1 drop to both eyes As Needed., Disp: , Rfl:   •  pravastatin (PRAVACHOL) 40 MG tablet, Take 40 mg by mouth Every Night., Disp: , Rfl:   •  Pyridoxine HCl (VITAMIN B-6 PO), Take 1 tablet by mouth Daily., Disp: , Rfl:   •  raloxifene (EVISTA) 60 MG tablet, Take 60 mg by mouth Daily., Disp: , Rfl:   •  senna (SENOKOT) 8.6 MG tablet tablet, Take 1 tablet by mouth Every Night., Disp: , Rfl:   •  sodium chloride 0.65 % nasal spray, 1 spray into the nostril(s) as directed by provider As Needed for Congestion., Disp: , Rfl:   •  sucralfate (CARAFATE) 1 g tablet, Take 1 tablet by mouth 2 (Two) Times a Day. Crush in one inch of water., Disp: 180 tablet, Rfl: 1  •  Triamcinolone Acetonide (NASACORT) 55 MCG/ACT nasal inhaler, 2  sprays into the nostril(s) as directed by provider Daily., Disp: , Rfl:   •  vitamin B-12 (CYANOCOBALAMIN) 250 MCG tablet, Take 250 mcg by mouth Daily., Disp: , Rfl:   •  Vitamin E 400 UNITS tablet, Take 400 Units by mouth Daily., Disp: , Rfl:     Allergies:  Patient has no known allergies.    The following portions of the patient's history were reviewed and updated as appropriate: allergies, current medications, past family history, past medical history, past social history, past surgical history and problem list.    Assessment/ Plan  Diagnoses and all orders for this visit:    1. Dysphagia, unspecified type (Primary)    2. Chronic idiopathic constipation    3. Gastroesophageal reflux disease without esophagitis         No follow-ups on file.    Patient Instructions   Increase frequency of Linzess as your schedule allows.  It is prescribed for daily use but recommend using when your schedule allows to help regulate bowel movements.  Also to consider taking Linzess in the evening or when you are going to be home based on your schedule to help promote more complete and regular bowel movements.  Also okay to take 1 Linzess in the morning and 1 in the evening based on your schedule.  Also okay to use Linzess and Senokot together as we discussed.    Acid reflux, continue acid medication, Pepcid and sucralfate.  Recommend sucralfate for 4 weeks.  Then okay to use sucralfate as needed.    Proceed with video swallow study as scheduled.  This will evaluate for alternative causes for trouble swallowing as we discussed.      If trouble swallowing worsens or returns and video swallow study is normal, to consider esophageal motility evaluation as we discussed.                Discussion:  Reviewed recent EGD in detail, summarized above.  Patient has had resolution of trouble swallowing since EGD.  She would like to proceed with video swallow study as scheduled.  If this is normal and she has a return of trouble swallowing, to  consider esophageal manometry evaluation.  Multiple etiologies for trouble swallowing include hiatal hernia, esophageal Candida, motility and video swallow study abnormality.    Longstanding complaints of constipation with multifactorial causes.  Her schedule is limited for regular use of treatment for constipation as she does not have regular access to the bathroom.  Because of her schedule she is typically only able to take medication once a week.  Encouraged her to change the timing of Linzess and see if she can take it in the evening based on her schedule.    Patient verbalized agreement and understanding, support and reassurance provided.  All questions answered.    This visit was completed as a telephone visit due to COVID-19 pandemic. This visit has been rescheduled as a phone visit to comply with patient safety concerns in accordance with CDC recommendations. Total time of discussion was 26 minutes.

## 2020-11-12 NOTE — PATIENT INSTRUCTIONS
Increase frequency of Linzess as your schedule allows.  It is prescribed for daily use but recommend using when your schedule allows to help regulate bowel movements.  Also to consider taking Linzess in the evening or when you are going to be home based on your schedule to help promote more complete and regular bowel movements.  Also okay to take 1 Linzess in the morning and 1 in the evening based on your schedule.  Also okay to use Linzess and Senokot together as we discussed.    Acid reflux, continue acid medication, Pepcid and sucralfate.  Recommend sucralfate for 4 weeks.  Then okay to use sucralfate as needed.    Proceed with video swallow study as scheduled.  This will evaluate for alternative causes for trouble swallowing as we discussed.      If trouble swallowing worsens or returns and video swallow study is normal, to consider esophageal motility evaluation as we discussed.

## 2020-11-14 ENCOUNTER — LAB (OUTPATIENT)
Dept: LAB | Facility: HOSPITAL | Age: 75
End: 2020-11-14

## 2020-11-14 DIAGNOSIS — Z01.818 OTHER SPECIFIED PRE-OPERATIVE EXAMINATION: ICD-10-CM

## 2020-11-14 PROCEDURE — C9803 HOPD COVID-19 SPEC COLLECT: HCPCS

## 2020-11-14 PROCEDURE — U0004 COV-19 TEST NON-CDC HGH THRU: HCPCS

## 2020-11-16 LAB — SARS-COV-2 RNA RESP QL NAA+PROBE: NOT DETECTED

## 2020-11-17 ENCOUNTER — HOSPITAL ENCOUNTER (OUTPATIENT)
Dept: GENERAL RADIOLOGY | Facility: HOSPITAL | Age: 75
Discharge: HOME | End: 2020-11-17
Admitting: INTERNAL MEDICINE

## 2020-11-17 DIAGNOSIS — R13.10 DYSPHAGIA, UNSPECIFIED TYPE: ICD-10-CM

## 2020-11-17 DIAGNOSIS — R05.9 COUGH: ICD-10-CM

## 2020-11-17 PROCEDURE — A9270 NON-COVERED ITEM OR SERVICE: HCPCS | Performed by: INTERNAL MEDICINE

## 2020-11-17 PROCEDURE — 63710000001 BARIUM SULFATE 40 % SUSPENSION: Performed by: INTERNAL MEDICINE

## 2020-11-17 PROCEDURE — 63710000001 BARIUM SULFATE 98 % RECONSTITUTED SUSPENSION: Performed by: INTERNAL MEDICINE

## 2020-11-17 PROCEDURE — 74230 X-RAY XM SWLNG FUNCJ C+: CPT

## 2020-11-17 PROCEDURE — 92611 MOTION FLUOROSCOPY/SWALLOW: CPT

## 2020-11-17 PROCEDURE — 63710000001 BARIUM SULFATE 40 % RECONSTITUTED SUSPENSION: Performed by: INTERNAL MEDICINE

## 2020-11-17 RX ADMIN — BARIUM SULFATE 50 ML: 400 SUSPENSION ORAL at 10:19

## 2020-11-17 RX ADMIN — BARIUM SULFATE 4 ML: 980 POWDER, FOR SUSPENSION ORAL at 10:18

## 2020-11-17 RX ADMIN — BARIUM SULFATE 55 ML: 0.81 POWDER, FOR SUSPENSION ORAL at 10:18

## 2020-11-17 NOTE — MBS/VFSS/FEES
Outpatient Speech Language Pathology   Adult Swallow Initial Evaluation  [facility]     Patient Name: Valerie Rock  : 1945  MRN: [id number]  Today's Date: 2020         Visit Date: 2020   Patient Active Problem List   Diagnosis   • Chronic idiopathic constipation   • Dysphagia        Past Medical History:   Diagnosis Date   • Anxiety and depression    • Aortic root enlargement (CMS/[facility])    • Aortic valve insufficiency    • Arthritis    • Asthma    • Bladder spasms    • Blepharitis of both eyes    • Bone spur     LUMBAR   • Chronic constipation    • COPD (chronic obstructive pulmonary disease) (CMS/HCC)    • Diverticular disease    • Dry eyes    • Facial paresthesia 2018    HISTORY OF    • Fecal incontinence     AT TIMES   • GERD (gastroesophageal reflux disease)    • Hashimoto's thyroiditis    • History of fracture of nasal bone 2019    IN 3 PLACES AFTER FALL   • Hyperlipidemia    • MVP (mitral valve prolapse)    • RLS (restless legs syndrome)    • Seasonal allergies    • Sleep apnea     USES C-PAP   • Stress incontinence     WEARS PADS   • Tinnitus     BOTH EARS AT TIMES   • Urinary incontinence    • Vaginal prolapse         Past Surgical History:   Procedure Laterality Date   • BLADDER SURGERY   &    • BREAST BIOPSY Left    • BREAST BIOPSY Right    • BREAST CYST EXCISION Left    • BREAST DUCT EXCISION Left    • CARDIAC CATHETERIZATION     • CATARACT EXTRACTION W/ INTRAOCULAR LENS IMPLANT Bilateral    • COLONOSCOPY     • CYSTOSCOPY BOTOX INJECTION OF BLADDER      X 2   • HYSTERECTOMY     • INTERSTIM PERC TEST  10/17/2017    DR. BRYAN SPENCER   • INTERSTIM PLACEMENT  10/31/2017    DR. BRYAN SPENCER   • PUBOVAGINAL SLING N/A 2020    Procedure: RETROPUBIC MID URATHRAL  SLING CYSTOCOPY;  Surgeon: Bryan Spencer MD;  Location: [facility];  Service: Gynecology   • SACROSPINUS SUSPENSION  2018   • THYROID SURGERY Right  07/27/2017    NODULES REMOVED   • TONSILLECTOMY  1950   • UPPER GASTROINTESTINAL ENDOSCOPY  ?         Visit Dx:     ICD-10-CM ICD-9-CM   1. Dysphagia, unspecified type  R13.10 787.20   2. Cough  R05 786.2           SLP Adult Swallow Evaluation     Row Name 11/17/20 1100       Rehab Evaluation    Document Type  evaluation  -CP    Subjective Information  no complaints  -CP    Patient Observations  alert;cooperative  -CP    Care Plan Review  evaluation/treatment results reviewed;care plan/treatment goals reviewed;patient/other agree to care plan  -CP    Patient Effort  excellent  -CP    Symptoms Noted During/After Treatment  none  -CP       General Information    Patient Profile Reviewed  yes  -CP    Pertinent History Of Current Problem  76 y/o F present for video swallow study due to complaints of difficulty swallowing solid foods and medications at times, as well as episodes of regurgitation. She has a history of GERD, a large hiatal hernia, asthma and COPD. Never a smoker. She reports CPAP use for sleep apnea, at times causing her to wake up with vomit in her mouth. She recently had esophageal dilation, with some improvement in symptoms, but clearly is still having significant issues.  -CP    Current Method of Nutrition  regular textures;thin liquids  -CP    Precautions/Limitations, Vision  WFL  -CP    Precautions/Limitations, Hearing  WFL  -CP    Prior Level of Function-Communication  WFL  -CP    Prior Level of Function-Swallowing  no diet consistency restrictions  -CP    Plans/Goals Discussed with  patient;agreed upon  -CP    Barriers to Rehab  none identified  -CP    Patient's Goals for Discharge  eat/drink without coughing/choking  -CP       Pain    Additional Documentation  Pain Scale: Numbers Pre/Post-Treatment (Group)  -CP       Pain Scale: Numbers Pre/Post-Treatment    Pretreatment Pain Rating  0/10 - no pain  -CP    Posttreatment Pain Rating  0/10 - no pain  -CP       Oral Motor Structure and Function     Dentition Assessment  natural, present and adequate  -CP    Secretion Management  WNL/WFL  -CP    Mucosal Quality  moist, healthy  -CP       General Eating/Swallowing Observations    Respiratory Support Currently in Use  room air  -CP       MBS/VFSS    Utensils Used  spoon;cup  -CP    Consistencies Trialed  thin liquids;nectar/syrup-thick liquids;pureed;soft textures;regular textures  -CP       MBS/VFSS Interpretation    VFSS Summary  Oropharyngeal swallow is within functional limits. No penetration, aspiration, or abnormal oral or pharyngeal residue observed. Esophageal screening revealed significantly reduced motility in the lower to mid esophagus, with multiple episodes of retrograde movement and esophageal retention. Further assessment of esophageal function is recommended. This was discussed in detail with patient, though it was made clear that this was only a screening and that he GI doctor would make further appropriate recommendations. Education focused on compensatory strategies such as elevating the head of her bed, reflux precautions, small frequent meals, small bites and sips, slow rate, etc. The patient is already compensating in many of these ways, but was appreciative of information.  -CP       SLP Communication to Radiology    Summary Statement  Oropharyngeal swallow is within functional limits. No penetration, aspiration, or abnormal oral or pharyngeal residue observed. Esophageal screening revealed significantly reduced motility in the lower to mid esophagus, with multiple episodes of retrograde movement and esophageal retention. Further assessment of esophageal function is recommended.  -CP       Clinical Impression    SLP Swallowing Diagnosis  esophageal dysphagia;functional oral phase;functional pharyngeal phase  -CP    Functional Impact  risk of aspiration/pneumonia;risk of malnutrition;risk of dehydration  -CP    Swallow Criteria for Skilled Therapeutic Interventions Met  no problems  "identified which require skilled intervention  -CP       Recommendations    Therapy Frequency (Swallow)  evaluation only  -CP    SLP Diet Recommendation  regular textures;thin liquids;other (see comments) \"slick\"  -CP    Recommended Precautions and Strategies  upright posture during/after eating;small bites of food and sips of liquid;multiple swallows per bite of food;multiple swallows per sip of liquid;alternate between small bites of food and sips of liquid;reflux precautions  -CP    Oral Care Recommendations  Oral Care BID/PRN  -CP    SLP Rec. for Method of Medication Administration  meds whole;meds crushed;with thin liquids;with pudding or applesauce;as tolerated  -CP    Monitor for Signs of Aspiration  yes;notify SLP if any concerns  -CP    Anticipated Discharge Disposition (SLP)  home  -CP    Demonstrates Need for Referral to Another Service  (S) dedicated esophageal assessment  -CP      User Key  (r) = Recorded By, (t) = Taken By, (c) = Cosigned By    Initials Name Provider Type    Yelena Palacios MS CCC-SLP Speech and Language Pathologist                        OP SLP Education     Row Name 11/17/20 1113       Education    Barriers to Learning  No barriers identified  -CP    Assessed  Learning needs;Learning motivation;Learning preferences;Learning readiness  -CP    Learning Motivation  Strong  -CP    Learning Method  Explanation  -CP    Teaching Response  Verbalized understanding  -CP    Education Comments  Reviewed VFSS images with pt for visualization  -CP      User Key  (r) = Recorded By, (t) = Taken By, (c) = Cosigned By    Initials Name Effective Dates    Yelena Palacios MS CCC-SLP 06/08/18 -               OP SLP Assessment/Plan - 11/17/20 1112        SLP Assessment    Functional Problems  Swallowing   -CP    Impact on Function: Swallowing  Risk of aspiration;Risk of dehydration;Risk of malnourishment   -CP    Clinical Impression: Swallowing  esophageal dysphagia   -CP    Please refer to " paper survey for additional self-reported information  No   -CP    Please refer to items scanned into chart for additional diagnostic informaiton and handouts as provided by clinician  No   -CP    Patient would benefit from skilled therapy intervention  No   -CP      User Key  (r) = Recorded By, (t) = Taken By, (c) = Cosigned By    Initials Name Provider Type    CP Yelena Elise MS CCC-SLP Speech and Language Pathologist              SLP Outcome Measures (last 72 hours)      SLP Outcome Measures     Row Name 11/17/20 1100             SLP Outcome Measures    Outcome Measure Used?  Adult NOMS  -CP         Adult FCM Scores    FCM Chosen  Swallowing  -CP      Swallowing FCM Score  6  -CP        User Key  (r) = Recorded By, (t) = Taken By, (c) = Cosigned By    Initials Name Effective Dates    Yelena Palacios MS CCC-SLP 06/08/18 -                Time Calculation:   SLP Start Time: 1000  SLP Stop Time: 1115  SLP Time Calculation (min): 75 min    Therapy Charges for Today     Code Description Service Date Service Provider Modifiers Qty    [id number] HC ST MOTION FLUORO EVAL SWALLOW 5 11/17/2020 Yelena Elise MS CCC-SLP GN 1        Patient was not wearing a face mask during this therapy encounter. Therapist used appropriate personal protective equipment including mask, eye protection and gloves.  Mask used was standard procedure mask. Appropriate PPE was worn during the entire therapy session. Hand hygiene was completed before and after therapy session. Patient is not in enhanced droplet precautions.                        MS JCARLOS Rogel  11/17/2020

## 2020-11-18 ENCOUNTER — OFFICE VISIT (OUTPATIENT)
Dept: CARDIOLOGY | Facility: CLINIC | Age: 75
End: 2020-11-18

## 2020-11-18 VITALS
BODY MASS INDEX: 37.89 KG/M2 | HEIGHT: 60 IN | WEIGHT: 193 LBS | HEART RATE: 75 BPM | DIASTOLIC BLOOD PRESSURE: 78 MMHG | SYSTOLIC BLOOD PRESSURE: 130 MMHG

## 2020-11-18 DIAGNOSIS — I49.1 APC (ATRIAL PREMATURE CONTRACTIONS): ICD-10-CM

## 2020-11-18 DIAGNOSIS — R00.2 PALPITATIONS: Primary | ICD-10-CM

## 2020-11-18 DIAGNOSIS — E78.2 MIXED HYPERLIPIDEMIA: ICD-10-CM

## 2020-11-18 PROCEDURE — 93000 ELECTROCARDIOGRAM COMPLETE: CPT | Performed by: INTERNAL MEDICINE

## 2020-11-18 PROCEDURE — 99214 OFFICE O/P EST MOD 30 MIN: CPT | Performed by: INTERNAL MEDICINE

## 2020-11-18 NOTE — PROGRESS NOTES
Date of Office Visit: 20  Encounter Provider: Alex Poole MD  Place of Service: [facility] CARDIOLOGY  Patient Name: Valerie Rock  :1945  Referral Provider:Alex Poole MD      Chief Complaint   Patient presents with   • Follow-up     History of Present Illness  The patient is a pleasant 75-year-old female who we have seen in the past for atypical  chest pain, a normal cardiac catheterization. Echocardiogram showed some mild aortic root  enlargement and mild aortic insufficiency, and mitral valve prolapse; however, a followup  echocardiogram in  did not show any of those things.  She comes in for follow-up.  The patient denies chest pain, pressure and heaviness. No shortness of breath, othopnea or PND.  She still gets some palpitations maybe a couple times a week lasting a few seconds without associated symptoms and actually are better.  No near syncope or syncope. No stroke type symptoms like paralysis, paresthesia, abrupt vision loss and dysarthria. No bleeding like blood in the stool or dark stools.  This problem she is having now is with her esophagus sugar pills get stuck some food gets stuck and she has had an upper GI endoscopy if swallowing study and may need something else done but she is following with gastroenterology.        Past Medical History:   Diagnosis Date   • Anxiety and depression    • Aortic root enlargement (CMS/HCC)    • Aortic valve insufficiency    • Arthritis    • Asthma    • Bladder spasms    • Blepharitis of both eyes    • Bone spur     LUMBAR   • Chronic constipation    • COPD (chronic obstructive pulmonary disease) (CMS/HCC)    • Diverticular disease    • Dry eyes    • Facial paresthesia 2018    HISTORY OF    • Fecal incontinence     AT TIMES   • GERD (gastroesophageal reflux disease)    • Hashimoto's thyroiditis    • History of fracture of nasal bone 2019    IN 3 PLACES AFTER FALL   • Hyperlipidemia    • MVP  (mitral valve prolapse)    • RLS (restless legs syndrome)    • Seasonal allergies    • Sleep apnea     USES C-PAP   • Stress incontinence     WEARS PADS   • Tinnitus     BOTH EARS AT TIMES   • Urinary incontinence    • Vaginal prolapse          Past Surgical History:   Procedure Laterality Date   • BLADDER SURGERY  1980 & 2003   • BREAST BIOPSY Left 1969   • BREAST BIOPSY Right 1979   • BREAST CYST EXCISION Left 1985   • BREAST DUCT EXCISION Left 2005   • CARDIAC CATHETERIZATION     • CATARACT EXTRACTION W/ INTRAOCULAR LENS IMPLANT Bilateral     • COLONOSCOPY  2017   • CYSTOSCOPY BOTOX INJECTION OF BLADDER      X 2   • HYSTERECTOMY  1985   • INTERSTIM PERC TEST  10/17/2017    DR. BRYAN BARTON   • INTERSTIM PLACEMENT  10/31/2017    DR. BRYAN BARTON   • PUBOVAGINAL SLING N/A 1/14/2020    Procedure: RETROPUBIC MID URATHRAL  SLING CYSTOCOPY;  Surgeon: Bryan Barton MD;  Location: [facility];  Service: Gynecology   • SACROSPINUS SUSPENSION  02/01/2018   • THYROID SURGERY Right 07/27/2017    NODULES REMOVED   • TONSILLECTOMY  1950   • UPPER GASTROINTESTINAL ENDOSCOPY  ?         Current Outpatient Medications on File Prior to Visit   Medication Sig Dispense Refill   • albuterol (PROVENTIL HFA) 108 (90 BASE) MCG/ACT inhaler Inhale 2 puffs Every 4 (Four) Hours As Needed.     • albuterol (PROVENTIL) (2.5 MG/3ML) 0.083% nebulizer solution Take 2.5 mg by nebulization Every 4 (Four) Hours As Needed.     • Ascorbic Acid (VITAMIN C) 500 MG capsule Take 2 tablets by mouth 2 (two) times a day.     • aspirin 81 MG EC tablet Take 81 mg by mouth Daily. WILL CHECK WITH DR. BARTON TO SEE WHEN SHE IS TO STOP ASPIRIN     • budesonide-formoterol (SYMBICORT) 80-4.5 MCG/ACT inhaler Inhale 2 puffs 2 (two) times a day.     • buPROPion SR (WELLBUTRIN SR) 150 MG 12 hr tablet Take 150 mg by mouth Every Morning.     • calcium carbonate-cholecalciferol 500-400 MG-UNIT tablet tablet Take  by mouth 2 (Two) Times a Day.     •  Cholecalciferol (VITAMIN D3) 2000 UNITS capsule Take 2,000 Units by mouth 2 (Two) Times a Day.     • citalopram (CeleXA) 40 MG tablet Take 40 mg by mouth Every Morning.     • Coenzyme Q10 (COQ-10 PO) Take 1 tablet by mouth Daily.     • Dextromethorphan-Guaifenesin (MUCINEX DM MAXIMUM STRENGTH)  MG tablet sustained-release 12 hour Take 1 capsule by mouth As Needed.     • famotidine (PEPCID) 40 MG tablet Take 1 tablet by mouth Daily. 90 tablet 1   • fexofenadine (ALLEGRA) 180 MG tablet Take 180 mg by mouth Daily.     • gabapentin (NEURONTIN) 300 MG capsule Take 300 mg by mouth Every Evening.     • ibuprofen (ADVIL,MOTRIN) 600 MG tablet Take 1 tablet by mouth Every 6 (Six) Hours As Needed for Mild Pain  or Moderate Pain . 20 tablet 0   • ipratropium (ATROVENT) 0.06 % nasal spray USE 1 TO 2 SPRAY(S) IN EACH NOSTRIL ONCE DAILY TO THREE TIMES DAILY     • levothyroxine (SYNTHROID, LEVOTHROID) 25 MCG tablet Take 25 mcg by mouth Daily.     • linaclotide (Linzess) 72 MCG capsule capsule Take 1 capsule by mouth Every Morning Before Breakfast. 14 capsule 0   • Magnesium 500 MG capsule Take 500 mg by mouth Every Morning.     • Mirabegron ER (MYRBETRIQ) 50 MG tablet sustained-release 24 hour 24 hr tablet Take 50 mg by mouth Every Morning.     • montelukast (SINGULAIR) 10 MG tablet Take 10 mg by mouth Every Night.     • Omega-3 Fatty Acids (OMEGA-3 FISH OIL PO) Take 1 capsule by mouth Every Morning.     • pantoprazole (PROTONIX) 40 MG EC tablet Take 1 tablet by mouth Every Morning. 90 tablet 3   • polyvinyl alcohol-povidone (REFRESH) 1.4-0.6 % ophthalmic solution Administer 1 drop to both eyes As Needed.     • pravastatin (PRAVACHOL) 40 MG tablet Take 40 mg by mouth Every Night.     • Pyridoxine HCl (VITAMIN B-6 PO) Take 1 tablet by mouth Daily.     • raloxifene (EVISTA) 60 MG tablet Take 60 mg by mouth Daily.     • senna (SENOKOT) 8.6 MG tablet tablet Take 1 tablet by mouth Every Night.     • sodium chloride 0.65 % nasal  spray 1 spray into the nostril(s) as directed by provider As Needed for Congestion.     • sucralfate (CARAFATE) 1 g tablet Take 1 tablet by mouth 2 (Two) Times a Day. Crush in one inch of water. 180 tablet 1   • Triamcinolone Acetonide (NASACORT) 55 MCG/ACT nasal inhaler 2 sprays into the nostril(s) as directed by provider Daily.     • vitamin B-12 (CYANOCOBALAMIN) 250 MCG tablet Take 250 mcg by mouth Daily.     • Vitamin E 400 UNITS tablet Take 400 Units by mouth Daily.       No current facility-administered medications on file prior to visit.          Social History     Socioeconomic History   • Marital status:      Spouse name: Not on file   • Number of children: Not on file   • Years of education: Not on file   • Highest education level: Not on file   Tobacco Use   • Smoking status: Never Smoker   • Smokeless tobacco: Never Used   • Tobacco comment: caffeine use - coffee   Substance and Sexual Activity   • Alcohol use: No   • Drug use: Never   • Sexual activity: Defer       Family History   Problem Relation Age of Onset   • Heart attack Mother    • Stroke Mother    • Colon cancer Mother    • Heart attack Brother    • Malig Hyperthermia Neg Hx    • Colon polyps Neg Hx    • Crohn's disease Neg Hx    • Irritable bowel syndrome Neg Hx    • Ulcerative colitis Neg Hx          Review of Systems   Constitution: Negative for decreased appetite, diaphoresis, fever, malaise/fatigue, weight gain and weight loss.   HENT: Negative for congestion, hearing loss, nosebleeds and tinnitus.    Eyes: Negative for blurred vision, double vision, vision loss in left eye, vision loss in right eye and visual disturbance.   Cardiovascular:        As noted in HPI   Respiratory:        As noted HPI   Endocrine: Negative for cold intolerance and heat intolerance.   Hematologic/Lymphatic: Negative for bleeding problem. Does not bruise/bleed easily.   Skin: Negative for color change, flushing, itching and rash.   Musculoskeletal:  "Negative for arthritis, back pain, joint pain, joint swelling, muscle weakness and myalgias.   Gastrointestinal: Negative for bloating, abdominal pain, constipation, diarrhea, dysphagia, heartburn, hematemesis, hematochezia, melena, nausea and vomiting.   Genitourinary: Negative for bladder incontinence, dysuria, frequency, nocturia and urgency.   Neurological: Negative for dizziness, focal weakness, headaches, light-headedness, loss of balance, numbness, paresthesias, vertigo and weakness.   Psychiatric/Behavioral: Negative for depression, memory loss and substance abuse.       Procedures      ECG 12 Lead    Date/Time: 11/18/2020 10:49 AM  Performed by: Alex Poole MD  Authorized by: Alex Poole MD   Comparison: compared with previous ECG   Similar to previous ECG  Rhythm: sinus rhythm  Rate: normal  QRS axis: normal                  Objective:    /78 (BP Location: Right arm)   Pulse 75   Ht 152.4 cm (60\")   Wt 87.5 kg (193 lb)   BMI 37.69 kg/m²        Physical Exam  Vitals signs reviewed.   Constitutional:       General: Not in acute distress.     Appearance: Well-developed. Not diaphoretic.   Eyes:      General: No scleral icterus.     Conjunctiva/sclera: Conjunctivae normal.      Pupils: Pupils are equal, round, and reactive to light.   HENT:      Head: Normocephalic.   Neck:      Musculoskeletal: No edema or erythema.      Thyroid: No thyromegaly.      Vascular: Normal carotid pulses. No carotid bruit, hepatojugular reflux or JVD.      Trachea: No tracheal deviation.   Pulmonary:      Effort: Pulmonary effort is normal. No respiratory distress.      Breath sounds: Normal breath sounds. No decreased breath sounds. No wheezing. No rhonchi. No rales.   Chest:      Chest wall: Not tender to palpatation.   Cardiovascular:      PMI at left midclavicular line. Normal rate. Regular rhythm. S1 with normal intensity. S2 with normal intensity.      No gallop. No click. No rub.   Pulses:     Intact " distal pulses.   Edema:     Peripheral edema absent.   Abdominal:      General: Bowel sounds are normal. There is no distension.      Palpations: Abdomen is soft. There is no abdominal mass.      Tenderness: There is no abdominal tenderness. There is no guarding or rebound.   Musculoskeletal:         General: No tenderness or deformity.      Extremities: No clubbing present.  Skin:     General: Skin is warm and dry. There is no cyanosis.      Coloration: Skin is not pale.      Findings: No erythema or rash.   Neurological:      Mental Status: Alert and oriented to person, place, and time.   Psychiatric:         Speech: Speech normal.         Behavior: Behavior normal.         Thought Content: Thought content normal.         Judgment: Judgment normal.             Assessment:   1. This is a 74-year-old female  History of valvular heart disease and aortic enlargement with aortic insufficiency.  Follow-up echocardiograms now on a couple of occasions of not confirmed that the last in 2016.  She will continue the same see us in follow-up in 1 year.  2. Questionable mitral valve prolapse; however, follow-up echocardiogram did not confirm this.    3.  Palpitations/this appears to be due to premature atrial beats.  Having more benign episodes without symptoms continue to follow clinically.    4. History of hyperlipidemia. On pravastatin followed by PCP.  5.  Hashimoto's thyroiditis follows with endocrine  6.  Severe urinary incontinence.  7.  Obesity body mass index of 37.   8.  Esophageal issues following up with GI.         Plan:

## 2021-10-26 ENCOUNTER — APPOINTMENT (OUTPATIENT)
Dept: WOMENS IMAGING | Facility: HOSPITAL | Age: 76
End: 2021-10-26

## 2021-10-26 PROCEDURE — 77067 SCR MAMMO BI INCL CAD: CPT | Performed by: RADIOLOGY

## 2021-10-26 PROCEDURE — 77063 BREAST TOMOSYNTHESIS BI: CPT | Performed by: RADIOLOGY

## 2021-11-18 ENCOUNTER — OFFICE VISIT (OUTPATIENT)
Dept: CARDIOLOGY | Facility: CLINIC | Age: 76
End: 2021-11-18

## 2021-11-18 VITALS
WEIGHT: 181 LBS | BODY MASS INDEX: 35.53 KG/M2 | HEIGHT: 60 IN | HEART RATE: 70 BPM | SYSTOLIC BLOOD PRESSURE: 120 MMHG | DIASTOLIC BLOOD PRESSURE: 70 MMHG

## 2021-11-18 DIAGNOSIS — I49.1 APC (ATRIAL PREMATURE CONTRACTIONS): Primary | ICD-10-CM

## 2021-11-18 DIAGNOSIS — I35.1 NONRHEUMATIC AORTIC VALVE INSUFFICIENCY: ICD-10-CM

## 2021-11-18 DIAGNOSIS — R06.09 DOE (DYSPNEA ON EXERTION): ICD-10-CM

## 2021-11-18 DIAGNOSIS — E78.2 MIXED HYPERLIPIDEMIA: ICD-10-CM

## 2021-11-18 DIAGNOSIS — R00.2 PALPITATIONS: ICD-10-CM

## 2021-11-18 PROCEDURE — 93000 ELECTROCARDIOGRAM COMPLETE: CPT | Performed by: NURSE PRACTITIONER

## 2021-11-18 PROCEDURE — 99214 OFFICE O/P EST MOD 30 MIN: CPT | Performed by: NURSE PRACTITIONER

## 2021-11-18 RX ORDER — FERROUS SULFATE 325(65) MG
325 TABLET ORAL DAILY
COMMUNITY

## 2021-11-18 RX ORDER — CARBOXYMETHYLCELLULOSE SODIUM 5 MG/ML
1 SOLUTION/ DROPS OPHTHALMIC 4 TIMES DAILY
Status: ON HOLD | COMMUNITY
End: 2022-06-06

## 2021-11-18 RX ORDER — METHOCARBAMOL 500 MG/1
TABLET, FILM COATED ORAL
COMMUNITY
Start: 2021-10-01 | End: 2022-06-02

## 2021-11-18 RX ORDER — LEVOCETIRIZINE DIHYDROCHLORIDE 5 MG/1
TABLET, FILM COATED ORAL
COMMUNITY
Start: 2021-11-11

## 2021-11-18 NOTE — PROGRESS NOTES
"Date of Office Visit: 21  Encounter Provider: ELENA Rucker  Place of Service: [facility]  Patient Name: Valerie Rock  :1945    Chief Complaint   Patient presents with   • Aortic Insufficiency   • Hyperlipidemia   • Follow-up   :     HPI: Valerie Rock is a 76 y.o. female  with hyperlipidemia, COPD, obstructive sleep apnea, aortic root enlargement, aortic insufficiency, palpitations, mitral valve prolapse and premature atrial contraction.  She also has history of vaginal prolapse and bladder spasms.      She was followed by Dr. Poole.  I will visit her for the first time today I reviewed her medical record.  She had issues in the past with atypical chest pain and had a normal heart catheterization.  Echocardiogram showed mild aortic root enlargement with mild aortic insufficiency and mitral valve prolapse.  She had a follow-up echocardiogram in  which did not show any of the above.  She then had some issues with dysphagia.  She had a video swallow 2020 which showed decreased motility of the lower to mid esophagus.  She now presents for reassessment.  She reports dyspnea on exertion after 10 to 15 feet.  This occurs if she \"walks anywhere.  She also has back pain secondary to degenerative disc disease which limits her mobility.  She is on B12 and iron replacement due to anemia.  She has follow-up with her PCP in the near future to follow-up on her blood counts.  She denies palpitation or swelling.  No chest pain.  No near syncope or syncope.  She does not exercise.  She normally wears CPAP but her machine has been recalled and she is waiting on replacement.  She is .  She does not have any children of her own.  She lives close to her brother and has a lot of niece and nephews of which she spends time with.    No Known Allergies        Family and social history reviewed.     ROS  All other systems were reviewed and are " "negative          Objective:     Vitals:    11/18/21 0918   BP: 120/70   BP Location: Left arm   Patient Position: Sitting   Pulse: 70   Weight: 82.1 kg (181 lb)   Height: 152.4 cm (60\")     Body mass index is 35.35 kg/m².    PHYSICAL EXAM:  Constitutional:       General: Not in acute distress.     Appearance: Well-developed. Not diaphoretic.   HENT:      Head: Normocephalic.   Pulmonary:      Effort: Pulmonary effort is normal. No respiratory distress.      Breath sounds: Normal breath sounds. No wheezing. No rhonchi. No rales.   Cardiovascular:      Normal rate. Regular rhythm.   Pulses:     Radial: 2+ bilaterally.  Skin:     General: Skin is warm and dry. There is no cyanosis.      Findings: No rash.   Neurological:      Mental Status: Alert and oriented to person, place, and time.   Psychiatric:         Behavior: Behavior normal.         Thought Content: Thought content normal.         Judgment: Judgment normal.           ECG 12 Lead    Date/Time: 11/18/2021 9:43 AM  Performed by: Nathalia Tariq APRN  Authorized by: Nathalia Tariq APRN   Comparison: compared with previous ECG   Similar to previous ECG  Rhythm: sinus rhythm  Rate: normal  QRS axis: normal                Current Outpatient Medications   Medication Sig Dispense Refill   • albuterol (PROVENTIL HFA) 108 (90 BASE) MCG/ACT inhaler Inhale 2 puffs Every 4 (Four) Hours As Needed.     • albuterol (PROVENTIL) (2.5 MG/3ML) 0.083% nebulizer solution Take 2.5 mg by nebulization Every 4 (Four) Hours As Needed.     • Ascorbic Acid (VITAMIN C) 500 MG capsule Take 2 tablets by mouth 2 (two) times a day.     • aspirin 81 MG EC tablet Take 81 mg by mouth Daily. WILL CHECK WITH DR. BARTON TO SEE WHEN SHE IS TO STOP ASPIRIN     • buPROPion SR (WELLBUTRIN SR) 150 MG 12 hr tablet Take 150 mg by mouth Every Morning.     • calcium carbonate-cholecalciferol 500-400 MG-UNIT tablet tablet Take  by mouth 2 (Two) Times a Day.     • carboxymethylcellulose (REFRESH PLUS) 0.5 % " solution Apply 1 drop to eye(s) as directed by provider 4 (Four) Times a Day.     • Cholecalciferol (VITAMIN D3) 2000 UNITS capsule Take 2,000 Units by mouth 2 (Two) Times a Day.     • citalopram (CeleXA) 40 MG tablet Take 40 mg by mouth Every Morning.     • Coenzyme Q10 (COQ-10 PO) Take 1 tablet by mouth Daily.     • Dextromethorphan-Guaifenesin (MUCINEX DM MAXIMUM STRENGTH)  MG tablet sustained-release 12 hour Take 1 capsule by mouth As Needed.     • famotidine (PEPCID) 40 MG tablet Take 1 tablet by mouth Daily. 90 tablet 1   • ferrous sulfate 325 (65 FE) MG tablet Take 325 mg by mouth Daily.     • fexofenadine (ALLEGRA) 180 MG tablet Take 180 mg by mouth Daily.     • fluticasone-salmeterol (ADVAIR) 250-50 MCG/DOSE DISKUS Inhale 1 puff 2 (Two) Times a Day.     • gabapentin (NEURONTIN) 300 MG capsule Take 300 mg by mouth Every Evening.     • ibuprofen (ADVIL,MOTRIN) 600 MG tablet Take 1 tablet by mouth Every 6 (Six) Hours As Needed for Mild Pain  or Moderate Pain . 20 tablet 0   • ipratropium (ATROVENT) 0.06 % nasal spray USE 1 TO 2 SPRAY(S) IN EACH NOSTRIL ONCE DAILY TO THREE TIMES DAILY     • levocetirizine (XYZAL) 5 MG tablet      • levothyroxine (SYNTHROID, LEVOTHROID) 25 MCG tablet Take 25 mcg by mouth Daily.     • Magnesium 500 MG capsule Take 500 mg by mouth Every Morning.     • methocarbamol (ROBAXIN) 500 MG tablet      • Mirabegron ER (MYRBETRIQ) 50 MG tablet sustained-release 24 hour 24 hr tablet Take 50 mg by mouth Every Morning.     • Omega-3 Fatty Acids (OMEGA-3 FISH OIL PO) Take 1 capsule by mouth Every Morning.     • pantoprazole (PROTONIX) 40 MG EC tablet Take 1 tablet by mouth Every Morning. 90 tablet 3   • polyvinyl alcohol-povidone (REFRESH) 1.4-0.6 % ophthalmic solution Administer 1 drop to both eyes As Needed.     • pravastatin (PRAVACHOL) 40 MG tablet Take 40 mg by mouth Every Night.     • Pyridoxine HCl (VITAMIN B-6 PO) Take 1 tablet by mouth Daily.     • raloxifene (EVISTA) 60 MG  tablet Take 60 mg by mouth Daily.     • senna (SENOKOT) 8.6 MG tablet tablet Take 1 tablet by mouth Every Night.     • sodium chloride 0.65 % nasal spray 1 spray into the nostril(s) as directed by provider As Needed for Congestion.     • sucralfate (CARAFATE) 1 g tablet Take 1 tablet by mouth 2 (Two) Times a Day. Crush in one inch of water. 180 tablet 1   • Triamcinolone Acetonide (NASACORT) 55 MCG/ACT nasal inhaler 2 sprays into the nostril(s) as directed by provider Daily.     • vitamin B-12 (CYANOCOBALAMIN) 250 MCG tablet Take 250 mcg by mouth Daily.     • Vitamin E 400 UNITS tablet Take 400 Units by mouth Daily.       No current facility-administered medications for this visit.     Assessment:       Diagnosis Plan   1. APC (atrial premature contractions)     2. Mixed hyperlipidemia     3. Palpitations          No orders of the defined types were placed in this encounter.        Plan:       1.  76-year-old female with history of mitral valve prolapse, aortic root enlargement and aortic insufficiency but follow-up echo August 2016 did not show that.  She preserved left ventricular systolic function small pericardial effusion noted.  She reports some dyspnea on exertion with 10 to 15 feet.  She will return for echocardiogram and if no significant change follow-up in 1 year  2.  Hypertension blood pressure appears adequately controlled.  3 hyperlipidemia she is on pravastatin.  4.  Hypothyroidism on replacement therapy  5.  COPD  6.  Esophageal dysmotility  7.  Obstructive sleep apnea normally on CPAP but her machine was recalled so she is waiting on replacement  8.  History of palpitations and premature atrial contraction no recent issues with palpitation     Plan to meet Dr. Castro in the future            It has been a pleasure to participate in this patient's care.      Thank you,  ELENA Rucker      **I used Dragon to dictate this note:**

## 2021-11-23 ENCOUNTER — HOSPITAL ENCOUNTER (OUTPATIENT)
Dept: CARDIOLOGY | Facility: HOSPITAL | Age: 76
Discharge: HOME | End: 2021-11-23
Admitting: NURSE PRACTITIONER

## 2021-11-23 ENCOUNTER — TELEPHONE (OUTPATIENT)
Dept: CARDIOLOGY | Facility: CLINIC | Age: 76
End: 2021-11-23

## 2021-11-23 VITALS
DIASTOLIC BLOOD PRESSURE: 70 MMHG | HEIGHT: 60 IN | BODY MASS INDEX: 35.53 KG/M2 | HEART RATE: 76 BPM | OXYGEN SATURATION: 96 % | SYSTOLIC BLOOD PRESSURE: 140 MMHG | WEIGHT: 181 LBS

## 2021-11-23 DIAGNOSIS — I35.1 NONRHEUMATIC AORTIC VALVE INSUFFICIENCY: ICD-10-CM

## 2021-11-23 DIAGNOSIS — R06.09 DOE (DYSPNEA ON EXERTION): ICD-10-CM

## 2021-11-23 LAB
AORTIC ARCH: 3.3 CM
ASCENDING AORTA: 3.7 CM
BH CV ECHO MEAS - ACS: 2.2 CM
BH CV ECHO MEAS - AO ARCH DIAM (PROXIMAL TRANS.): 3.3 CM
BH CV ECHO MEAS - AO MAX PG (FULL): 3.4 MMHG
BH CV ECHO MEAS - AO MAX PG: 7.9 MMHG
BH CV ECHO MEAS - AO MEAN PG (FULL): 2.6 MMHG
BH CV ECHO MEAS - AO MEAN PG: 4.8 MMHG
BH CV ECHO MEAS - AO ROOT AREA (BSA CORRECTED): 1.9
BH CV ECHO MEAS - AO ROOT AREA: 8.7 CM^2
BH CV ECHO MEAS - AO ROOT DIAM: 3.3 CM
BH CV ECHO MEAS - AO V2 MAX: 140.3 CM/SEC
BH CV ECHO MEAS - AO V2 MEAN: 103.9 CM/SEC
BH CV ECHO MEAS - AO V2 VTI: 33.5 CM
BH CV ECHO MEAS - ASC AORTA: 3.7 CM
BH CV ECHO MEAS - AVA(I,A): 2.3 CM^2
BH CV ECHO MEAS - AVA(I,D): 2.3 CM^2
BH CV ECHO MEAS - AVA(V,A): 2.6 CM^2
BH CV ECHO MEAS - AVA(V,D): 2.6 CM^2
BH CV ECHO MEAS - BSA(HAYCOCK): 1.9 M^2
BH CV ECHO MEAS - BSA: 1.8 M^2
BH CV ECHO MEAS - BZI_BMI: 35.3 KILOGRAMS/M^2
BH CV ECHO MEAS - BZI_METRIC_HEIGHT: 152.4 CM
BH CV ECHO MEAS - BZI_METRIC_WEIGHT: 82.1 KG
BH CV ECHO MEAS - EDV(CUBED): 66.7 ML
BH CV ECHO MEAS - EDV(MOD-SP2): 68 ML
BH CV ECHO MEAS - EDV(MOD-SP4): 68 ML
BH CV ECHO MEAS - EDV(TEICH): 72.3 ML
BH CV ECHO MEAS - EF(CUBED): 63.1 %
BH CV ECHO MEAS - EF(MOD-BP): 64.8 %
BH CV ECHO MEAS - EF(MOD-SP2): 64.7 %
BH CV ECHO MEAS - EF(MOD-SP4): 63.2 %
BH CV ECHO MEAS - EF(TEICH): 55.1 %
BH CV ECHO MEAS - ESV(CUBED): 24.6 ML
BH CV ECHO MEAS - ESV(MOD-SP2): 24 ML
BH CV ECHO MEAS - ESV(MOD-SP4): 25 ML
BH CV ECHO MEAS - ESV(TEICH): 32.5 ML
BH CV ECHO MEAS - FS: 28.3 %
BH CV ECHO MEAS - IVS/LVPW: 0.88
BH CV ECHO MEAS - IVSD: 0.94 CM
BH CV ECHO MEAS - LAT PEAK E' VEL: 7.7 CM/SEC
BH CV ECHO MEAS - LV DIASTOLIC VOL/BSA (35-75): 38 ML/M^2
BH CV ECHO MEAS - LV MASS(C)D: 131.2 GRAMS
BH CV ECHO MEAS - LV MASS(C)DI: 73.3 GRAMS/M^2
BH CV ECHO MEAS - LV MAX PG: 4.5 MMHG
BH CV ECHO MEAS - LV MEAN PG: 2.2 MMHG
BH CV ECHO MEAS - LV SYSTOLIC VOL/BSA (12-30): 14 ML/M^2
BH CV ECHO MEAS - LV V1 MAX: 105.5 CM/SEC
BH CV ECHO MEAS - LV V1 MEAN: 68.5 CM/SEC
BH CV ECHO MEAS - LV V1 VTI: 21.7 CM
BH CV ECHO MEAS - LVIDD: 4.1 CM
BH CV ECHO MEAS - LVIDS: 2.9 CM
BH CV ECHO MEAS - LVLD AP2: 7.9 CM
BH CV ECHO MEAS - LVLD AP4: 6.9 CM
BH CV ECHO MEAS - LVLS AP2: 5.5 CM
BH CV ECHO MEAS - LVLS AP4: 6.1 CM
BH CV ECHO MEAS - LVOT AREA (M): 3.5 CM^2
BH CV ECHO MEAS - LVOT AREA: 3.5 CM^2
BH CV ECHO MEAS - LVOT DIAM: 2.1 CM
BH CV ECHO MEAS - LVPWD: 1.1 CM
BH CV ECHO MEAS - MED PEAK E' VEL: 5.9 CM/SEC
BH CV ECHO MEAS - MV A DUR: 0.1 SEC
BH CV ECHO MEAS - MV A MAX VEL: 87.9 CM/SEC
BH CV ECHO MEAS - MV DEC SLOPE: 356.1 CM/SEC^2
BH CV ECHO MEAS - MV DEC TIME: 0.17 SEC
BH CV ECHO MEAS - MV E MAX VEL: 103 CM/SEC
BH CV ECHO MEAS - MV E/A: 1.2
BH CV ECHO MEAS - MV MAX PG: 3.8 MMHG
BH CV ECHO MEAS - MV MEAN PG: 2.1 MMHG
BH CV ECHO MEAS - MV P1/2T MAX VEL: 97.9 CM/SEC
BH CV ECHO MEAS - MV P1/2T: 80.5 MSEC
BH CV ECHO MEAS - MV V2 MAX: 97.9 CM/SEC
BH CV ECHO MEAS - MV V2 MEAN: 69.9 CM/SEC
BH CV ECHO MEAS - MV V2 VTI: 35.3 CM
BH CV ECHO MEAS - MVA P1/2T LCG: 2.2 CM^2
BH CV ECHO MEAS - MVA(P1/2T): 2.7 CM^2
BH CV ECHO MEAS - MVA(VTI): 2.2 CM^2
BH CV ECHO MEAS - PA ACC TIME: 0.08 SEC
BH CV ECHO MEAS - PA MAX PG (FULL): 0.42 MMHG
BH CV ECHO MEAS - PA MAX PG: 3.2 MMHG
BH CV ECHO MEAS - PA PR(ACCEL): 43.3 MMHG
BH CV ECHO MEAS - PA V2 MAX: 89.6 CM/SEC
BH CV ECHO MEAS - PULM A REVS DUR: 0.08 SEC
BH CV ECHO MEAS - PULM A REVS VEL: 36.1 CM/SEC
BH CV ECHO MEAS - PULM DIAS VEL: 37.6 CM/SEC
BH CV ECHO MEAS - PULM S/D: 1.4
BH CV ECHO MEAS - PULM SYS VEL: 53.4 CM/SEC
BH CV ECHO MEAS - PVA(V,A): 4.1 CM^2
BH CV ECHO MEAS - PVA(V,D): 4.1 CM^2
BH CV ECHO MEAS - QP/QS: 1
BH CV ECHO MEAS - RAP SYSTOLE: 3 MMHG
BH CV ECHO MEAS - RV MAX PG: 2.8 MMHG
BH CV ECHO MEAS - RV MEAN PG: 1.4 MMHG
BH CV ECHO MEAS - RV V1 MAX: 83.6 CM/SEC
BH CV ECHO MEAS - RV V1 MEAN: 54.5 CM/SEC
BH CV ECHO MEAS - RV V1 VTI: 18 CM
BH CV ECHO MEAS - RVOT AREA: 4.4 CM^2
BH CV ECHO MEAS - RVOT DIAM: 2.4 CM
BH CV ECHO MEAS - RVSP: 25 MMHG
BH CV ECHO MEAS - SI(AO): 161.9 ML/M^2
BH CV ECHO MEAS - SI(CUBED): 23.5 ML/M^2
BH CV ECHO MEAS - SI(LVOT): 42.8 ML/M^2
BH CV ECHO MEAS - SI(MOD-SP2): 24.6 ML/M^2
BH CV ECHO MEAS - SI(MOD-SP4): 24 ML/M^2
BH CV ECHO MEAS - SI(TEICH): 22.3 ML/M^2
BH CV ECHO MEAS - SUP REN AO DIAM: 1.9 CM
BH CV ECHO MEAS - SV(AO): 289.6 ML
BH CV ECHO MEAS - SV(CUBED): 42.1 ML
BH CV ECHO MEAS - SV(LVOT): 76.5 ML
BH CV ECHO MEAS - SV(MOD-SP2): 44 ML
BH CV ECHO MEAS - SV(MOD-SP4): 43 ML
BH CV ECHO MEAS - SV(RVOT): 78.9 ML
BH CV ECHO MEAS - SV(TEICH): 39.9 ML
BH CV ECHO MEAS - TAPSE (>1.6): 2.3 CM
BH CV ECHO MEAS - TR MAX VEL: 232.2 CM/SEC
BH CV ECHO MEASUREMENTS AVERAGE E/E' RATIO: 15.15
BH CV XLRA - RV BASE: 2.6 CM
BH CV XLRA - RV LENGTH: 6.4 CM
BH CV XLRA - RV MID: 2.4 CM
BH CV XLRA - TDI S': 9.6 CM/SEC
LEFT ATRIUM VOLUME INDEX: 29 ML/M2
LV EF 2D ECHO EST: 65 %
MAXIMAL PREDICTED HEART RATE: 143 BPM
SINUS: 3.6 CM
STJ: 3.7 CM
STRESS TARGET HR: 122 BPM

## 2021-11-23 PROCEDURE — 93306 TTE W/DOPPLER COMPLETE: CPT | Performed by: INTERNAL MEDICINE

## 2021-11-23 PROCEDURE — 93306 TTE W/DOPPLER COMPLETE: CPT

## 2021-11-23 NOTE — TELEPHONE ENCOUNTER
Left voicemail echocardiogram shows no significant change.  Encourage increased physical activity and to call with any further questions or concerns otherwise follow-up in 1 year with me

## 2021-12-28 ENCOUNTER — PREP FOR SURGERY (OUTPATIENT)
Dept: SURGERY | Facility: SURGERY CENTER | Age: 76
End: 2021-12-28

## 2021-12-28 ENCOUNTER — OFFICE VISIT (OUTPATIENT)
Dept: GASTROENTEROLOGY | Facility: CLINIC | Age: 76
End: 2021-12-28

## 2021-12-28 VITALS
WEIGHT: 176 LBS | HEART RATE: 71 BPM | HEIGHT: 60 IN | OXYGEN SATURATION: 96 % | SYSTOLIC BLOOD PRESSURE: 122 MMHG | BODY MASS INDEX: 34.55 KG/M2 | DIASTOLIC BLOOD PRESSURE: 66 MMHG | TEMPERATURE: 98 F

## 2021-12-28 DIAGNOSIS — Z80.0 FAMILY HISTORY OF COLON CANCER: ICD-10-CM

## 2021-12-28 DIAGNOSIS — Z12.11 ENCOUNTER FOR SCREENING FOR MALIGNANT NEOPLASM OF COLON: ICD-10-CM

## 2021-12-28 DIAGNOSIS — K21.9 GASTROESOPHAGEAL REFLUX DISEASE WITHOUT ESOPHAGITIS: Primary | ICD-10-CM

## 2021-12-28 DIAGNOSIS — Z12.11 ENCOUNTER FOR SCREENING FOR MALIGNANT NEOPLASM OF COLON: Primary | ICD-10-CM

## 2021-12-28 DIAGNOSIS — K63.5 COLON POLYPS: ICD-10-CM

## 2021-12-28 PROCEDURE — 99213 OFFICE O/P EST LOW 20 MIN: CPT | Performed by: NURSE PRACTITIONER

## 2021-12-28 RX ORDER — PANTOPRAZOLE SODIUM 40 MG/1
40 TABLET, DELAYED RELEASE ORAL EVERY MORNING
Qty: 90 TABLET | Refills: 3 | Status: SHIPPED | OUTPATIENT
Start: 2021-12-28 | End: 2022-03-24 | Stop reason: SDUPTHER

## 2021-12-28 RX ORDER — SODIUM CHLORIDE, SODIUM LACTATE, POTASSIUM CHLORIDE, CALCIUM CHLORIDE 600; 310; 30; 20 MG/100ML; MG/100ML; MG/100ML; MG/100ML
30 INJECTION, SOLUTION INTRAVENOUS CONTINUOUS PRN
Status: CANCELLED | OUTPATIENT
Start: 2021-12-28

## 2021-12-28 RX ORDER — SODIUM CHLORIDE 0.9 % (FLUSH) 0.9 %
3 SYRINGE (ML) INJECTION EVERY 12 HOURS SCHEDULED
Status: CANCELLED | OUTPATIENT
Start: 2021-12-28

## 2021-12-28 RX ORDER — SODIUM CHLORIDE 0.9 % (FLUSH) 0.9 %
10 SYRINGE (ML) INJECTION AS NEEDED
Status: CANCELLED | OUTPATIENT
Start: 2021-12-28

## 2021-12-28 NOTE — PROGRESS NOTES
Chief Complaint   Patient presents with   • Heartburn         History of Present Illness  76-year-old female presents today for follow-up.  Last visit November 12, 2020.  Patient has a history of constipation, rectal bleeding, ileal stenosis, fecal soiling, dysphagia and bloating.  Also family history of colon cancer in her mother at age 74.  Last EGD November 5, 2020.  Video swallow study with speech therapist November 17, 2020.  Last colonoscopy June 1, 2017.    She has had improvement in swallowing pills but has difficulty with her statin pill at times. No other difficulty with other pills. No difficulty with food or liquids.      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  She will take Senokot as her schedule allows but can go up to 14 days without taking Senokot.  At times she has a spontaneous bowel movement with complete evacuation but this is not predictable.    She continues to follow with Dr. Spencer regarding urinary complaints.  She has had numerous gynecological and bladder surgeries most recent January 2020 with bladder sling placement.  She also has sacral nerve stimulator for bladder issues.  This initially provided improvement in constipation however this did not provide continued improvement and she still will experience incomplete evacuation.    She has had previous evaluation for constipation that includes defecating proctogram and anal rectal manometry April 2017.    Previous treatments for constipation include lower and higher doses of Linzess (unpredictability and urgency), lactulose and Amitiza as well as enemas with no improvement in symptoms.  Also prior use of Motegrity.     Review of Systems   Genitourinary: Positive for frequency.         Result Review :       ENDOSCOPY, INT (11/05/2020)   FL Video Swallow With Speech Single Contrast  "(11/17/2020 10:19)   Tissue Pathology Exam (11/05/2020 10:50)   Defecating proctogram April 2017.  Small anterior rectocele with complete rectal evacuation.  Mild rectal incontinence more pronounced in the sitting position.  Anal rectal junction appears relatively low lying in neutral position.  Mild elevation of the puborectalis during squeeze maneuver.     Anal rectal manometry April 18, 2017.  Normal resting anal canal pressure.  Low squeeze pressure.  For sensation in the rectal vault was impaired.  Desire to defecate was impaired.  Maximum tolerance volume in the rectum was normal.  Rectal anal inhibitory reflex was complete.  Consistent with weak internal/external sphincter.  Consistent with impaired rectal sensation.  Consistent with pelvic floor disorder.  No defecation stimulation reported.  Recommendations for pelvic floor retraining and questionable benefit of sacral stimulator.    Vital Signs:   /66   Pulse 71   Temp 98 °F (36.7 °C)   Ht 152.4 cm (60\")   Wt 79.8 kg (176 lb)   SpO2 96%   BMI 34.37 kg/m²     Body mass index is 34.37 kg/m².     Physical Exam  Vitals reviewed.   Constitutional:       General: She is not in acute distress.     Appearance: Normal appearance. She is well-developed and normal weight. She is not ill-appearing.   Pulmonary:      Effort: Pulmonary effort is normal. No respiratory distress.   Skin:     Coloration: Skin is not pale.   Neurological:      Mental Status: She is alert.       Assessment and Plan    Diagnoses and all orders for this visit:    1. Gastroesophageal reflux disease without esophagitis (Primary)  -     pantoprazole (PROTONIX) 40 MG EC tablet; Take 1 tablet by mouth Every Morning.  Dispense: 90 tablet; Refill: 3    2. Encounter for screening for malignant neoplasm of colon  -     polyethylene glycol (GoLYTELY) 236 g solution; Take as directed for bowel prep  Dispense: 4000 mL; Refill: 0       Recommended for repeat colonoscopy June 2022 for family " history of colon cancer and personal history of adenomatous colon polyps.  She will need a 2-day bowel prep, orders placed for prescription bowel prep as well.    Reviewed video swallow study with speech therapist November 17, 2020.  Patient with significant reduced motility in the lower to mid esophagus with multiple episodes of retrograde movement and esophageal retention.  Recommend continued strategies including elevating head of the bed, strict reflux precaution, small frequent meals, small bites and sips, slow rate.    EGD November 5, 2020 with large hiatal hernia, suspicion for esophageal Candida however brushing was negative and mild gastritis.  Recommend using applesauce to help with swallowing statin pill in the evening.  This is the only pill that causes occasional difficulty.    Acid reflux, well controlled, she was able to discontinue famotidine and continues on pantoprazole 40 mg once daily, refill provided.    For longstanding chronic idiopathic constipation, recommend continued Senokot as schedule allows.    Follow-up visit yearly for refills, sooner if symptoms change or condition warrants.          Patient Instructions   Take pills with applesauce    Refill pantoprazole 40 mg once daily for acid reflux    Recommend continued strategies including elevating head of the bed, strict reflux precaution, small frequent meals, small bites and sips, slow rate.      Family history of colon cancer and colon polyps, due for colonoscopy June 2022, orders placed. Two day bowel prep needed for longstanding constipation and prescription prep since to pharmacy.           EMR Dragon/Transcription Disclaimer:  This document has been Dictated utilizing Dragon dictation.

## 2021-12-28 NOTE — PATIENT INSTRUCTIONS
Take pills with applesauce    Refill pantoprazole 40 mg once daily for acid reflux    Recommend continued strategies including elevating head of the bed, strict reflux precaution, small frequent meals, small bites and sips, slow rate.      Family history of colon cancer and colon polyps, due for colonoscopy June 2022, orders placed. Two day bowel prep needed for longstanding constipation and prescription prep since to pharmacy.

## 2022-03-24 DIAGNOSIS — K21.9 GASTROESOPHAGEAL REFLUX DISEASE WITHOUT ESOPHAGITIS: ICD-10-CM

## 2022-03-24 RX ORDER — PANTOPRAZOLE SODIUM 40 MG/1
40 TABLET, DELAYED RELEASE ORAL EVERY MORNING
Qty: 90 TABLET | Refills: 3 | Status: SHIPPED | OUTPATIENT
Start: 2022-03-24

## 2022-05-17 ENCOUNTER — TELEPHONE (OUTPATIENT)
Dept: GASTROENTEROLOGY | Facility: CLINIC | Age: 77
End: 2022-05-17

## 2022-05-17 NOTE — TELEPHONE ENCOUNTER
Patient called and has a colonoscopy scheduled for 6/6/2020. Patient wanted to know if she can use her Atrovent the day of her procedure? Please advise.

## 2022-06-06 ENCOUNTER — ANESTHESIA (OUTPATIENT)
Dept: SURGERY | Facility: SURGERY CENTER | Age: 77
End: 2022-06-06

## 2022-06-06 ENCOUNTER — HOSPITAL ENCOUNTER (OUTPATIENT)
Facility: SURGERY CENTER | Age: 77
Setting detail: HOSPITAL OUTPATIENT SURGERY
Discharge: HOME | End: 2022-06-06
Attending: INTERNAL MEDICINE | Admitting: INTERNAL MEDICINE

## 2022-06-06 ENCOUNTER — ANESTHESIA EVENT (OUTPATIENT)
Dept: SURGERY | Facility: SURGERY CENTER | Age: 77
End: 2022-06-06

## 2022-06-06 VITALS
DIASTOLIC BLOOD PRESSURE: 69 MMHG | HEART RATE: 74 BPM | RESPIRATION RATE: 20 BRPM | TEMPERATURE: 98.2 F | SYSTOLIC BLOOD PRESSURE: 120 MMHG | BODY MASS INDEX: 32.98 KG/M2 | WEIGHT: 168 LBS | HEIGHT: 60 IN | OXYGEN SATURATION: 98 %

## 2022-06-06 DIAGNOSIS — Z12.11 ENCOUNTER FOR SCREENING FOR MALIGNANT NEOPLASM OF COLON: ICD-10-CM

## 2022-06-06 DIAGNOSIS — K63.5 COLON POLYPS: ICD-10-CM

## 2022-06-06 DIAGNOSIS — Z80.0 FAMILY HISTORY OF COLON CANCER: ICD-10-CM

## 2022-06-06 PROCEDURE — 45380 COLONOSCOPY AND BIOPSY: CPT | Performed by: INTERNAL MEDICINE

## 2022-06-06 PROCEDURE — 88305 TISSUE EXAM BY PATHOLOGIST: CPT | Performed by: INTERNAL MEDICINE

## 2022-06-06 PROCEDURE — 25010000002 PROPOFOL 10 MG/ML EMULSION: Performed by: ANESTHESIOLOGY

## 2022-06-06 PROCEDURE — 45385 COLONOSCOPY W/LESION REMOVAL: CPT | Performed by: INTERNAL MEDICINE

## 2022-06-06 RX ORDER — SODIUM CHLORIDE 0.9 % (FLUSH) 0.9 %
3 SYRINGE (ML) INJECTION EVERY 12 HOURS SCHEDULED
Status: DISCONTINUED | OUTPATIENT
Start: 2022-06-06 | End: 2022-06-06 | Stop reason: HOSPADM

## 2022-06-06 RX ORDER — LIDOCAINE HYDROCHLORIDE 20 MG/ML
INJECTION, SOLUTION INFILTRATION; PERINEURAL AS NEEDED
Status: DISCONTINUED | OUTPATIENT
Start: 2022-06-06 | End: 2022-06-06 | Stop reason: SURG

## 2022-06-06 RX ORDER — SODIUM CHLORIDE 0.9 % (FLUSH) 0.9 %
10 SYRINGE (ML) INJECTION AS NEEDED
Status: DISCONTINUED | OUTPATIENT
Start: 2022-06-06 | End: 2022-06-06 | Stop reason: HOSPADM

## 2022-06-06 RX ORDER — PROPOFOL 10 MG/ML
VIAL (ML) INTRAVENOUS AS NEEDED
Status: DISCONTINUED | OUTPATIENT
Start: 2022-06-06 | End: 2022-06-06 | Stop reason: SURG

## 2022-06-06 RX ORDER — SODIUM CHLORIDE, SODIUM LACTATE, POTASSIUM CHLORIDE, CALCIUM CHLORIDE 600; 310; 30; 20 MG/100ML; MG/100ML; MG/100ML; MG/100ML
30 INJECTION, SOLUTION INTRAVENOUS CONTINUOUS PRN
Status: DISCONTINUED | OUTPATIENT
Start: 2022-06-06 | End: 2022-06-06 | Stop reason: HOSPADM

## 2022-06-06 RX ORDER — MAGNESIUM HYDROXIDE 1200 MG/15ML
LIQUID ORAL AS NEEDED
Status: DISCONTINUED | OUTPATIENT
Start: 2022-06-06 | End: 2022-06-06 | Stop reason: HOSPADM

## 2022-06-06 RX ADMIN — PROPOFOL 60 MG: 10 INJECTION, EMULSION INTRAVENOUS at 08:15

## 2022-06-06 RX ADMIN — PROPOFOL 120 MCG/KG/MIN: 10 INJECTION, EMULSION INTRAVENOUS at 08:17

## 2022-06-06 RX ADMIN — LIDOCAINE HYDROCHLORIDE 30 MG: 20 INJECTION, SOLUTION INFILTRATION; PERINEURAL at 08:15

## 2022-06-06 RX ADMIN — SODIUM CHLORIDE, POTASSIUM CHLORIDE, SODIUM LACTATE AND CALCIUM CHLORIDE 30 ML/HR: 600; 310; 30; 20 INJECTION, SOLUTION INTRAVENOUS at 07:52

## 2022-06-06 NOTE — H&P
No chief complaint on file.      HPI  Fh colon cancer  H/o colon polyps         Problem List:    Patient Active Problem List   Diagnosis   • Chronic idiopathic constipation   • Dysphagia   • Encounter for screening for malignant neoplasm of colon   • Colon polyps   • Family history of colon cancer       Medical History:    Past Medical History:   Diagnosis Date   • Anxiety and depression    • Aortic root enlargement (HCC)    • Aortic valve insufficiency    • Arthritis    • Asthma    • Bladder spasms    • Blepharitis of both eyes    • Bone spur     LUMBAR   • Chronic constipation    • COPD (chronic obstructive pulmonary disease) (HCC)    • Diverticular disease    • Dry eyes    • Facial paresthesia 11/2018    HISTORY OF    • Fecal incontinence     AT TIMES   • GERD (gastroesophageal reflux disease)    • Hashimoto's thyroiditis    • History of fracture of nasal bone 07/22/2019    IN 3 PLACES AFTER FALL   • Hyperlipidemia    • MVP (mitral valve prolapse)    • RLS (restless legs syndrome)    • Seasonal allergies    • Sleep apnea     USES C-PAP   • Stress incontinence     WEARS PADS   • Tinnitus     BOTH EARS AT TIMES   • Urinary incontinence    • Vaginal prolapse         Social History:    Social History     Socioeconomic History   • Marital status:    Tobacco Use   • Smoking status: Never Smoker   • Smokeless tobacco: Never Used   • Tobacco comment: caffeine use - coffee   Substance and Sexual Activity   • Alcohol use: No   • Drug use: Never   • Sexual activity: Defer       Family History:   Family History   Problem Relation Age of Onset   • Heart attack Mother    • Stroke Mother    • Colon cancer Mother    • Heart attack Brother    • Malig Hyperthermia Neg Hx    • Colon polyps Neg Hx    • Crohn's disease Neg Hx    • Irritable bowel syndrome Neg Hx    • Ulcerative colitis Neg Hx        Surgical History:   Past Surgical History:   Procedure Laterality Date   • BLADDER SURGERY  1980 & 2003   • BREAST BIOPSY Left  1969   • BREAST BIOPSY Right 1979   • BREAST CYST EXCISION Left 1985   • BREAST DUCT EXCISION Left 2005   • CARDIAC CATHETERIZATION     • CATARACT EXTRACTION W/ INTRAOCULAR LENS IMPLANT Bilateral     • COLONOSCOPY  2017   • CYSTOSCOPY BOTOX INJECTION OF BLADDER      X 2   • HYSTERECTOMY  1985   • INTERSTIM PERC TEST  10/17/2017    DR. BRYAN BARTON   • INTERSTIM PLACEMENT  10/31/2017    DR. BRYAN BARTON   • PUBOVAGINAL SLING N/A 1/14/2020    Procedure: RETROPUBIC MID URATHRAL  SLING CYSTOCOPY;  Surgeon: Bryan Barton MD;  Location: [facility];  Service: Gynecology   • SACROSPINUS SUSPENSION  02/01/2018   • THYROID SURGERY Right 07/27/2017    NODULES REMOVED   • TONSILLECTOMY  1950   • UPPER GASTROINTESTINAL ENDOSCOPY  ?       No current facility-administered medications for this encounter.    Current Outpatient Medications:   •  albuterol (PROVENTIL HFA) 108 (90 BASE) MCG/ACT inhaler, Inhale 2 puffs Every 4 (Four) Hours As Needed., Disp: , Rfl:   •  albuterol (PROVENTIL) (2.5 MG/3ML) 0.083% nebulizer solution, Take 2.5 mg by nebulization Every 4 (Four) Hours As Needed., Disp: , Rfl:   •  Ascorbic Acid (VITAMIN C) 500 MG capsule, Take 2 tablets by mouth 2 (two) times a day., Disp: , Rfl:   •  aspirin 81 MG EC tablet, Take 81 mg by mouth Daily. WILL CHECK WITH DR. BARTON TO SEE WHEN SHE IS TO STOP ASPIRIN, Disp: , Rfl:   •  calcium carbonate-cholecalciferol 500-400 MG-UNIT tablet tablet, Take  by mouth 2 (Two) Times a Day., Disp: , Rfl:   •  carboxymethylcellulose (REFRESH PLUS) 0.5 % solution, Apply 1 drop to eye(s) as directed by provider 4 (Four) Times a Day., Disp: , Rfl:   •  Cholecalciferol (VITAMIN D3) 2000 UNITS capsule, Take 2,000 Units by mouth 2 (Two) Times a Day., Disp: , Rfl:   •  citalopram (CeleXA) 40 MG tablet, Take 40 mg by mouth Every Morning., Disp: , Rfl:   •  Coenzyme Q10 (COQ-10 PO), Take 1 tablet by mouth Daily., Disp: , Rfl:   •  Dextromethorphan-Guaifenesin (MUCINEX DM  MAXIMUM STRENGTH)  MG tablet sustained-release 12 hour, Take 1 capsule by mouth As Needed., Disp: , Rfl:   •  ferrous sulfate 325 (65 FE) MG tablet, Take 325 mg by mouth Daily., Disp: , Rfl:   •  fexofenadine (ALLEGRA) 180 MG tablet, Take 180 mg by mouth Daily., Disp: , Rfl:   •  fluticasone-salmeterol (ADVAIR) 250-50 MCG/DOSE DISKUS, Inhale 1 puff 2 (Two) Times a Day., Disp: , Rfl:   •  gabapentin (NEURONTIN) 300 MG capsule, Take 300 mg by mouth Every Evening., Disp: , Rfl:   •  ibuprofen (ADVIL,MOTRIN) 600 MG tablet, Take 1 tablet by mouth Every 6 (Six) Hours As Needed for Mild Pain  or Moderate Pain ., Disp: 20 tablet, Rfl: 0  •  ipratropium (ATROVENT) 0.06 % nasal spray, USE 1 TO 2 SPRAY(S) IN EACH NOSTRIL ONCE DAILY TO THREE TIMES DAILY, Disp: , Rfl:   •  levocetirizine (XYZAL) 5 MG tablet, , Disp: , Rfl:   •  Magnesium 500 MG capsule, Take 500 mg by mouth Every Morning., Disp: , Rfl:   •  Mirabegron ER (MYRBETRIQ) 50 MG tablet sustained-release 24 hour 24 hr tablet, Take 50 mg by mouth Every Morning., Disp: , Rfl:   •  Omega-3 Fatty Acids (OMEGA-3 FISH OIL PO), Take 1 capsule by mouth Every Morning., Disp: , Rfl:   •  pantoprazole (PROTONIX) 40 MG EC tablet, Take 1 tablet by mouth Every Morning., Disp: 90 tablet, Rfl: 3  •  polyethylene glycol (GoLYTELY) 236 g solution, Take as directed for bowel prep, Disp: 4000 mL, Rfl: 0  •  polyvinyl alcohol-povidone (REFRESH) 1.4-0.6 % ophthalmic solution, Administer 1 drop to both eyes As Needed., Disp: , Rfl:   •  pravastatin (PRAVACHOL) 40 MG tablet, Take 40 mg by mouth Every Night., Disp: , Rfl:   •  Pyridoxine HCl (VITAMIN B-6 PO), Take 1 tablet by mouth Daily., Disp: , Rfl:   •  raloxifene (EVISTA) 60 MG tablet, Take 60 mg by mouth Daily., Disp: , Rfl:   •  senna (SENOKOT) 8.6 MG tablet tablet, Take 1 tablet by mouth Every Night., Disp: , Rfl:   •  sodium chloride 0.65 % nasal spray, 1 spray into the nostril(s) as directed by provider As Needed for  Congestion., Disp: , Rfl:   •  sucralfate (CARAFATE) 1 g tablet, Take 1 tablet by mouth 2 (Two) Times a Day. Crush in one inch of water., Disp: 180 tablet, Rfl: 1  •  Triamcinolone Acetonide (NASACORT) 55 MCG/ACT nasal inhaler, 2 sprays into the nostril(s) as directed by provider Daily., Disp: , Rfl:   •  vitamin B-12 (CYANOCOBALAMIN) 250 MCG tablet, Take 250 mcg by mouth Daily., Disp: , Rfl:   •  Vitamin E 400 UNITS tablet, Take 400 Units by mouth Daily., Disp: , Rfl:     Allergies: No Known Allergies     The following portions of the patient's history were reviewed by me and updated as appropriate: review of systems, allergies, current medications, past family history, past medical history, past social history, past surgical history and problem list.    There were no vitals filed for this visit.    PHYSICAL EXAM:    CONSTITUTIONAL:  today's vital signs reviewed by me  GASTROINTESTINAL: abdomen is soft nontender nondistended with normal active bowel sounds, no masses are appreciated    Assessment/ Plan  Fh colon cancer  H/o colon polyps    colonoscopy    Risks and benefits as well as alternatives to endoscopic evaluation were explained to the patient and they voiced understanding and wish to proceed.  These risks include but are not limited to the risk of bleeding, perforation, adverse reaction to sedation, and missed lesions.  The patient was given the opportunity to ask questions prior to the endoscopic procedure.

## 2022-06-06 NOTE — ANESTHESIA PREPROCEDURE EVALUATION
Anesthesia Evaluation     Patient summary reviewed and Nursing notes reviewed   NPO Solid Status: > 8 hours  NPO Liquid Status: > 2 hours           Airway   Mallampati: II  TM distance: >3 FB  Neck ROM: full  Possible difficult intubation  Dental - normal exam     Pulmonary - normal exam   (+) COPD mild, asthma,sleep apnea on CPAP,   Cardiovascular - normal exam  Exercise tolerance: good (4-7 METS)    (+) valvular problems/murmurs murmur and AI, hyperlipidemia,       Neuro/Psych  (+) psychiatric history Depression and Anxiety,    GI/Hepatic/Renal/Endo    (+) obesity,  GERD,  thyroid problem     Musculoskeletal     Abdominal    Substance History - negative use     OB/GYN negative ob/gyn ROS         Other   arthritis,                        Anesthesia Plan    ASA 3     MAC     intravenous induction     Anesthetic plan, all risks, benefits, and alternatives have been provided, discussed and informed consent has been obtained with: patient.

## 2022-06-06 NOTE — ANESTHESIA POSTPROCEDURE EVALUATION
"Patient: Valerie Rock    Procedure Summary     Date: 06/06/22 Room / Location: [facility]    Anesthesia Start: 0811 Anesthesia Stop: 0846    Procedure: COLONOSCOPY with polypectomy and biopsy (N/A ) Diagnosis:       Encounter for screening for malignant neoplasm of colon      Colon polyps      Family history of colon cancer      (Encounter for screening for malignant neoplasm of colon [Z12.11])      (Colon polyps [K63.5])      (Family history of colon cancer [Z80.0])    Surgeons: Deuce Barrera MD Provider: Kenji Guillermo MD    Anesthesia Type: MAC ASA Status: 3          Anesthesia Type: MAC    Vitals  Vitals Value Taken Time   /69 06/06/22 0905   Temp 36.8 °C (98.2 °F) 06/06/22 0845   Pulse 74 06/06/22 0905   Resp 20 06/06/22 0905   SpO2 98 % 06/06/22 0905           Post Anesthesia Care and Evaluation    Patient location during evaluation: PACU  Patient participation: complete - patient participated  Level of consciousness: awake and alert  Pain management: adequate  Airway patency: patent  Anesthetic complications: No anesthetic complications    Cardiovascular status: acceptable  Respiratory status: acceptable  Hydration status: acceptable    Comments: /69 (BP Location: Left arm, Patient Position: Sitting)   Pulse 74   Temp 36.8 °C (98.2 °F) (Temporal)   Resp 20   Ht 152.4 cm (60\")   Wt 76.2 kg (168 lb)   SpO2 98%   BMI 32.81 kg/m²         "

## 2022-06-07 LAB
LAB AP CASE REPORT: NORMAL
LAB AP CLINICAL INFORMATION: NORMAL
PATH REPORT.FINAL DX SPEC: NORMAL
PATH REPORT.GROSS SPEC: NORMAL

## 2022-11-01 ENCOUNTER — APPOINTMENT (OUTPATIENT)
Dept: WOMENS IMAGING | Facility: HOSPITAL | Age: 77
End: 2022-11-01

## 2022-11-01 PROCEDURE — 77063 BREAST TOMOSYNTHESIS BI: CPT | Performed by: RADIOLOGY

## 2022-11-01 PROCEDURE — 77067 SCR MAMMO BI INCL CAD: CPT | Performed by: RADIOLOGY

## 2023-01-11 ENCOUNTER — OFFICE VISIT (OUTPATIENT)
Dept: CARDIOLOGY | Facility: CLINIC | Age: 78
End: 2023-01-11
Payer: MEDICARE

## 2023-01-11 VITALS
DIASTOLIC BLOOD PRESSURE: 70 MMHG | SYSTOLIC BLOOD PRESSURE: 120 MMHG | HEIGHT: 60 IN | WEIGHT: 179.6 LBS | BODY MASS INDEX: 35.26 KG/M2 | HEART RATE: 65 BPM

## 2023-01-11 DIAGNOSIS — I35.1 NONRHEUMATIC AORTIC VALVE INSUFFICIENCY: Primary | ICD-10-CM

## 2023-01-11 DIAGNOSIS — E78.2 MIXED HYPERLIPIDEMIA: ICD-10-CM

## 2023-01-11 DIAGNOSIS — I49.1 APC (ATRIAL PREMATURE CONTRACTIONS): ICD-10-CM

## 2023-01-11 PROCEDURE — 93000 ELECTROCARDIOGRAM COMPLETE: CPT | Performed by: NURSE PRACTITIONER

## 2023-01-11 PROCEDURE — 99214 OFFICE O/P EST MOD 30 MIN: CPT | Performed by: NURSE PRACTITIONER

## 2023-01-11 RX ORDER — LEVOTHYROXINE SODIUM 0.05 MG/1
50 TABLET ORAL DAILY
COMMUNITY
Start: 2022-12-20

## 2023-01-11 RX ORDER — BUPROPION HYDROCHLORIDE 150 MG/1
TABLET ORAL
COMMUNITY
Start: 2022-12-20

## 2023-01-11 NOTE — PROGRESS NOTES
Date of Office Visit: 23  Encounter Provider: ELENA Rucker  Place of Service: [facility]  Patient Name: Valerie Rock  :1945    Chief Complaint   Patient presents with   • Palpitations   • APC (atrial premature contractions)   • Follow-up   :     HPI: Valerie Rock is a 77 y.o. female  with hyperlipidemia, COPD, obstructive sleep apnea, aortic root enlargement, aortic insufficiency, palpitations, mitral valve prolapse and premature atrial contraction.  She also has history of vaginal prolapse and bladder spasms.        She was followed by Dr. Poole.  I will visit with her in follow up today I reviewed her medical record.  She had issues in the past with atypical chest pain and had a normal heart catheterization.  Echocardiogram showed mild aortic root enlargement with mild aortic insufficiency and mitral valve prolapse.  She had a follow-up echocardiogram in  which did not show any of the above.  She then had some issues with dysphagia.  She had a video swallow 2020 which showed decreased motility of the lower to mid esophagus.    She presents today for annual reassessment.  She has been doing well overall.  She reportedly had colonoscopy and had noncancerous polyps removed.  She denies chest pain or shortness of breath.  She has occasional palpitations when she is stressed described as her heart beating fast but this lasted few minutes and it improves.  Her main complaint is bladder incontinence.  She is following with Dr. Miguelangel Spencer with urogynecology.  She was tried on Botox injection and Myrbetriq but she needs to make another appointment.          No Known Allergies        Family and social history reviewed.     ROS  All other systems were reviewed and are negative          Objective:     Vitals:    23 0805   BP: 120/70   BP Location: Left arm   Patient Position: Sitting   Pulse: 65   Weight: 81.5 kg (179 lb 9.6 oz)  "  Height: 152.4 cm (60\")     Body mass index is 35.08 kg/m².    PHYSICAL EXAM:  Pulmonary:      Effort: Pulmonary effort is normal.      Breath sounds: Normal breath sounds.   Cardiovascular:      Normal rate. Regular rhythm.           ECG 12 Lead    Date/Time: 1/11/2023 8:31 AM  Performed by: Nathalia Tariq APRN  Authorized by: Nathalia Tariq APRN   Comparison: compared with previous ECG   Similar to previous ECG  Rhythm: sinus rhythm  Rate: normal  T flattening: III  QRS axis: normal                Current Outpatient Medications   Medication Sig Dispense Refill   • albuterol (PROVENTIL) (2.5 MG/3ML) 0.083% nebulizer solution Take 2.5 mg by nebulization Every 4 (Four) Hours As Needed.     • albuterol sulfate  (90 Base) MCG/ACT inhaler Inhale 2 puffs Every 4 (Four) Hours As Needed.     • Ascorbic Acid (VITAMIN C) 500 MG capsule Take 2 tablets by mouth 2 (two) times a day.     • aspirin 81 MG EC tablet Take 81 mg by mouth Daily. WILL CHECK WITH DR. BARTON TO SEE WHEN SHE IS TO STOP ASPIRIN     • buPROPion XL (WELLBUTRIN XL) 150 MG 24 hr tablet TAKE 1 TABLET BY MOUTH ONCE DAILY IN THE MORNING. DO NOT CRUSH, CHEW OR SPLIT.     • calcium carbonate-cholecalciferol 500-400 MG-UNIT tablet tablet Take  by mouth 2 (Two) Times a Day.     • Cholecalciferol (VITAMIN D3) 2000 UNITS capsule Take 2,000 Units by mouth 2 (Two) Times a Day.     • citalopram (CeleXA) 40 MG tablet Take 40 mg by mouth Every Morning.     • Coenzyme Q10 (COQ-10 PO) Take 1 tablet by mouth Daily.     • Dextromethorphan-Guaifenesin (MUCINEX DM MAXIMUM STRENGTH)  MG tablet sustained-release 12 hour Take 1 capsule by mouth As Needed.     • ferrous sulfate 325 (65 FE) MG tablet Take 325 mg by mouth Daily.     • fexofenadine (ALLEGRA) 180 MG tablet Take 180 mg by mouth Daily.     • fluticasone-salmeterol (ADVAIR) 250-50 MCG/DOSE DISKUS Inhale 1 puff 2 (Two) Times a Day.     • gabapentin (NEURONTIN) 300 MG capsule Take 300 mg by mouth Every " Evening.     • ipratropium (ATROVENT) 0.06 % nasal spray USE 1 TO 2 SPRAY(S) IN EACH NOSTRIL ONCE DAILY TO THREE TIMES DAILY     • levocetirizine (XYZAL) 5 MG tablet      • levothyroxine (SYNTHROID, LEVOTHROID) 50 MCG tablet Take 50 mcg by mouth Daily.     • Magnesium 500 MG capsule Take 500 mg by mouth Every Morning.     • Omega-3 Fatty Acids (OMEGA-3 FISH OIL PO) Take 1 capsule by mouth Every Morning.     • pantoprazole (PROTONIX) 40 MG EC tablet Take 1 tablet by mouth Every Morning. 90 tablet 3   • polyvinyl alcohol-povidone (HYPOTEARS) 1.4-0.6 % ophthalmic solution Administer 1 drop to both eyes As Needed.     • pravastatin (PRAVACHOL) 40 MG tablet Take 40 mg by mouth Every Night.     • raloxifene (EVISTA) 60 MG tablet Take 60 mg by mouth Daily.     • senna (SENOKOT) 8.6 MG tablet tablet Take 1 tablet by mouth Every Night.     • sodium chloride 0.65 % nasal spray 1 spray into the nostril(s) as directed by provider As Needed for Congestion.     • sucralfate (CARAFATE) 1 g tablet Take 1 tablet by mouth 2 (Two) Times a Day. Crush in one inch of water. 180 tablet 1   • Triamcinolone Acetonide (NASACORT) 55 MCG/ACT nasal inhaler 2 sprays into the nostril(s) as directed by provider Daily.       No current facility-administered medications for this visit.     Assessment:       Diagnosis Plan   1. Nonrheumatic aortic valve insufficiency  ECG 12 Lead      2. APC (atrial premature contractions)  ECG 12 Lead      3. Mixed hyperlipidemia             Orders Placed This Encounter   Procedures   • ECG 12 Lead     This order was created via procedure documentation     Order Specific Question:   Release to patient     Answer:   Routine Release         Plan:     1.  77-year-old female with history of mitral valve prolapse, aortic root enlargement and aortic insufficiency but follow-up echo August 2016 did not show that.  She preserved left ventricular systolic function small pericardial effusion noted.  Last echo November  2020  Showed mild mitral regurgitation but no prolapse.  There was thickened aortic valve but no significant stenosis or regurgitation.  There was trivial pericardial effusion  2.  Hypertension blood pressure appears adequately controlled.  3 hyperlipidemia she is on pravastatin.  4.  Hypothyroidism on replacement therapy  5.  COPD  6.  Esophageal dysmotility  7.  Obstructive sleep apnea normally on CPAP.  She has been having some sinus issues that she is not using her CPAP as much  8.  History of palpitations and premature atrial contraction no recent issues with palpitation  9. Aortic valve sclerosis without regurgitation or stenosis on echo 11/2021  10. Borderline dilated ascending aorta at 3.7 cm on echo 11/2021  Follow-up in 1 year call with questions or concerns        It has been a pleasure to participate in this patient's care.      Thank you,  ELENA Rucker      **I used Dragon to dictate this note:**

## 2023-06-01 ENCOUNTER — OFFICE VISIT (OUTPATIENT)
Dept: GASTROENTEROLOGY | Facility: CLINIC | Age: 78
End: 2023-06-01

## 2023-06-01 VITALS
DIASTOLIC BLOOD PRESSURE: 78 MMHG | OXYGEN SATURATION: 97 % | WEIGHT: 178.3 LBS | HEIGHT: 60 IN | HEART RATE: 69 BPM | SYSTOLIC BLOOD PRESSURE: 134 MMHG | TEMPERATURE: 98 F | BODY MASS INDEX: 35.01 KG/M2

## 2023-06-01 DIAGNOSIS — K59.04 CHRONIC IDIOPATHIC CONSTIPATION: ICD-10-CM

## 2023-06-01 DIAGNOSIS — Z80.0 FAMILY HISTORY OF COLON CANCER: ICD-10-CM

## 2023-06-01 DIAGNOSIS — K21.9 GASTROESOPHAGEAL REFLUX DISEASE WITHOUT ESOPHAGITIS: Primary | ICD-10-CM

## 2023-06-01 DIAGNOSIS — R13.19 ESOPHAGEAL DYSPHAGIA: ICD-10-CM

## 2023-06-01 PROCEDURE — 99214 OFFICE O/P EST MOD 30 MIN: CPT | Performed by: NURSE PRACTITIONER

## 2023-06-01 PROCEDURE — 1159F MED LIST DOCD IN RCRD: CPT | Performed by: NURSE PRACTITIONER

## 2023-06-01 PROCEDURE — 1160F RVW MEDS BY RX/DR IN RCRD: CPT | Performed by: NURSE PRACTITIONER

## 2023-06-01 RX ORDER — LEVOCETIRIZINE DIHYDROCHLORIDE 5 MG/1
5 TABLET, FILM COATED ORAL
COMMUNITY
Start: 2023-03-20 | End: 2023-09-16

## 2023-06-01 RX ORDER — CLINDAMYCIN PALMITATE HYDROCHLORIDE 75 MG/5ML
SOLUTION ORAL 3 TIMES DAILY
COMMUNITY

## 2023-06-01 RX ORDER — PANTOPRAZOLE SODIUM 40 MG/1
40 TABLET, DELAYED RELEASE ORAL EVERY MORNING
Qty: 90 TABLET | Refills: 3 | Status: SHIPPED | OUTPATIENT
Start: 2023-06-01

## 2023-06-01 NOTE — PATIENT INSTRUCTIONS
Reviewed colonoscopy June 6, 2022, recommend colonoscopy in 3 years for colon cancer surveillance    Plans for repeat EGD June 2025 with colonoscopy  However if trouble swallowing worsens, please call office and we determine if EGD with dialtion or other testing is needed based on symptoms    Daily pantoprazole, refill provided    For GERD, follow antireflux precautions.  Recommend avoiding eating within 3 to 4 hours of bedtime.  Avoid foods that can trigger symptoms which may include citrus fruits, spicy foods, tomatoes and red sauces, chocolate, coffee/tea, caffeinated or carbonated beverages, alcohol.     Some modifications to help while you are having trouble swallowing include:   Avoiding highly textured foods such as meats and bulky foods such as bagels and breads.   Cutting food into smaller pieces.   Extensively chew foods.   Washing foods down with liquids and eating more slowly.  Also purposefully eating which includes avoiding distractions or talking while focusing on chewing completely and purposefully.    Follow up with Dr Spencer as discussed    Sennaika as schedule allows    Follow up visit yearly, sooner if symptoms change or condition warrants

## 2023-06-01 NOTE — PROGRESS NOTES
Chief Complaint   Patient presents with   • Follow-up     Acid reflux           History of Present Illness  77-year-old female presents today for follow-up.    Last visit December 28, 2021.  Last colonoscopy June 6, 2022.  She has a history of constipation, rectal bleeding, ileal stenosis, fecal soiling, bloating, dysphagia.    She has had defecating proctogram and anal rectal manometry April 2017.  Family history of colon cancer in her mother at age 74.    She is requesting refills of pantoprazole, this works well for acid reflux.     She can have trouble swallowing her statin pill and bulkier foods at times such as chicken or breads. No forced regurgitation.   She is able to use crackers and peanut butter to help with passage of statin.     Due to her schedule regular use of senna is limited.   She has found that eating smaller more frequent meals helps with constipation and spontaneous bowel movement.  No rectal bleeding or melena.   She had a couple of months of fecal incontinence but this spontaneously resolved, she has not had any episodes recently  Previous treatments for constipation include Linzess (unpredictable urgency and incomplete response), lactulose, Amitiza, enemas, Senokot, Motegrity.    She is going to schedule follow up with Dr. Spencer regarding worsening urinary complaints.   She has had numerous gynecological and bladder surgeries most recent January 2020 with bladder sling placement.    She has interstim placed 10/2017.   She is unsure if this is working.  She has not noticed improvement in constipation since surgery.    Review of Systems      Result Review :       Defecating proctogram April 2017.    Small anterior rectocele with complete rectal evacuation.  Mild rectal incontinence more pronounced in the sitting position.  Anal rectal junction appears relatively low lying in neutral position.  Mild elevation of the puborectalis during squeeze maneuver.     Anal rectal manometry April 18,  "2017.  Normal resting anal canal pressure.  Low squeeze pressure.  For sensation in the rectal vault was impaired.  Desire to defecate was impaired.  Maximum tolerance volume in the rectum was normal.  Rectal anal inhibitory reflex was complete.  Consistent with weak internal/external sphincter.  Consistent with impaired rectal sensation.  Consistent with pelvic floor disorder.  No defecation stimulation reported.  Recommendations for pelvic floor retraining and questionable benefit of sacral stimulator.    June 1, 2017 colonoscopy for hematochezia and constipation.  Bowel prep was good.  Perianal and digital rectal exam normal, multiple small mouth diverticula found in the sigmoid colon.  No evidence of stenosis of the ileocecal valve.  Hepatic flexure polyp consistent with tubular adenoma.    Tissue Pathology Exam (06/06/2022 08:29)   COLONOSCOPY (06/06/2022 08:08)     Vital Signs:   /78   Pulse 69   Temp 98 °F (36.7 °C)   Ht 152.4 cm (60\")   Wt 80.9 kg (178 lb 4.8 oz)   SpO2 97%   BMI 34.82 kg/m²     Body mass index is 34.82 kg/m².     Physical Exam  Vitals reviewed.   Constitutional:       General: She is not in acute distress.     Appearance: Normal appearance. She is well-developed. She is not ill-appearing, toxic-appearing or diaphoretic.   Eyes:      General: No scleral icterus.  Pulmonary:      Effort: Pulmonary effort is normal. No respiratory distress.   Abdominal:      General: Abdomen is flat. Bowel sounds are normal. There is no distension.      Palpations: Abdomen is soft.      Tenderness: There is no abdominal tenderness. There is no guarding.   Skin:     Coloration: Skin is not jaundiced or pale.   Neurological:      Mental Status: She is alert.   Psychiatric:         Mood and Affect: Mood normal.         Behavior: Behavior normal.         Thought Content: Thought content normal.         Judgment: Judgment normal.           Assessment and Plan    Diagnoses and all orders for this " visit:    1. Esophageal dysphagia (Primary)    2. Gastroesophageal reflux disease without esophagitis  -     pantoprazole (PROTONIX) 40 MG EC tablet; Take 1 tablet by mouth Every Morning.  Dispense: 90 tablet; Refill: 3    3. Chronic idiopathic constipation    4. Family history of colon cancer           Reviewed colonoscopy June 6, 2022, recommend colonoscopy in 3 years for colon cancer surveillance    Plans for repeat EGD June 2025 with colonoscopy, placed in recall  However if trouble swallowing worsens, please call office and we determine if EGD with dialtion or other testing is needed based on symptoms    Daily pantoprazole, refill provided    Senna as schedule allows  If fecal soiling returns and is persistent, please contact the office    Please follow-up with Dr. Spencer as discussed for worsening urinary incontinence.    Follow up visit yearly, sooner if symptoms change or condition warrants    Recommend taking pills with applesauce or yogurt to help with passage and dysphagia precautions discussed as well.          Patient Instructions   Reviewed colonoscopy June 6, 2022, recommend colonoscopy in 3 years for colon cancer surveillance    Plans for repeat EGD June 2025 with colonoscopy  However if trouble swallowing worsens, please call office and we determine if EGD with dialtion or other testing is needed based on symptoms    Daily pantoprazole, refill provided    For GERD, follow antireflux precautions.  Recommend avoiding eating within 3 to 4 hours of bedtime.  Avoid foods that can trigger symptoms which may include citrus fruits, spicy foods, tomatoes and red sauces, chocolate, coffee/tea, caffeinated or carbonated beverages, alcohol.     Some modifications to help while you are having trouble swallowing include:   Avoiding highly textured foods such as meats and bulky foods such as bagels and breads.   Cutting food into smaller pieces.   Extensively chew foods.   Washing foods down with liquids and  eating more slowly.  Also purposefully eating which includes avoiding distractions or talking while focusing on chewing completely and purposefully.    Follow up with Dr Spencer as discussed    Senna as schedule allows    Follow up visit yearly, sooner if symptoms change or condition warrants          EMR Dragon/Transcription Disclaimer:  This document has been Dictated utilizing Dragon dictation.

## 2024-01-12 ENCOUNTER — OFFICE VISIT (OUTPATIENT)
Dept: CARDIOLOGY | Facility: CLINIC | Age: 79
End: 2024-01-12
Payer: MEDICARE

## 2024-01-12 VITALS
SYSTOLIC BLOOD PRESSURE: 110 MMHG | DIASTOLIC BLOOD PRESSURE: 80 MMHG | HEIGHT: 60 IN | OXYGEN SATURATION: 96 % | WEIGHT: 177.4 LBS | HEART RATE: 74 BPM | BODY MASS INDEX: 34.83 KG/M2

## 2024-01-12 DIAGNOSIS — G47.33 OBSTRUCTIVE SLEEP APNEA: ICD-10-CM

## 2024-01-12 DIAGNOSIS — E78.2 MIXED HYPERLIPIDEMIA: ICD-10-CM

## 2024-01-12 DIAGNOSIS — I10 PRIMARY HYPERTENSION: Primary | ICD-10-CM

## 2024-01-12 PROCEDURE — 99214 OFFICE O/P EST MOD 30 MIN: CPT | Performed by: INTERNAL MEDICINE

## 2024-01-12 PROCEDURE — 1159F MED LIST DOCD IN RCRD: CPT | Performed by: INTERNAL MEDICINE

## 2024-01-12 PROCEDURE — 3079F DIAST BP 80-89 MM HG: CPT | Performed by: INTERNAL MEDICINE

## 2024-01-12 PROCEDURE — 3074F SYST BP LT 130 MM HG: CPT | Performed by: INTERNAL MEDICINE

## 2024-01-12 PROCEDURE — 1160F RVW MEDS BY RX/DR IN RCRD: CPT | Performed by: INTERNAL MEDICINE

## 2024-01-12 PROCEDURE — 93000 ELECTROCARDIOGRAM COMPLETE: CPT | Performed by: INTERNAL MEDICINE

## 2024-01-12 RX ORDER — CLINDAMYCIN PHOSPHATE 11.9 MG/ML
SOLUTION TOPICAL
COMMUNITY
Start: 2023-10-10

## 2024-01-12 RX ORDER — KETOCONAZOLE 20 MG/ML
SHAMPOO TOPICAL
COMMUNITY
Start: 2023-11-03

## 2024-01-12 RX ORDER — TERBINAFINE HYDROCHLORIDE 250 MG/1
250 TABLET ORAL DAILY
COMMUNITY
Start: 2023-12-12

## 2024-01-12 NOTE — PROGRESS NOTES
"      CARDIOLOGY    Maren Castro MD    ENCOUNTER DATE:  01/12/2024    Valerie Rock / 78 y.o. / female        CHIEF COMPLAINT / REASON FOR OFFICE VISIT     Nonrheumatic aortic valve insufficiency      HISTORY OF PRESENT ILLNESS       HPI    Valerie oRck is a 78 y.o. female     This is a patient who previously followed with Dr. Poole. She had a normal heart catheterization in the remote past. Remotely she had an echo which showed some mild aortic root enlargement with mitral valve prolapse and mild aortic insufficiency, but a follow-up echo in 2011 did not show these findings.     She was last seen by Nathalia in January of 2023.     Echocardiogram in November of 2021 showed normal LV function, grade 2 diastolic dysfunction, mitral regurgitation and an ascending aorta of 3.7 cm.       She is doing well.  She denies any chest pain.  She has some shortness of breath.  No coughing or wheezing.    REVIEW OF SYSTEMS     Review of Systems   Constitutional: Negative for chills, fever, weight gain and weight loss.   Cardiovascular:  Negative for leg swelling.   Respiratory:  Negative for cough, snoring and wheezing.    Hematologic/Lymphatic: Negative for bleeding problem. Does not bruise/bleed easily.   Skin:  Negative for color change.   Musculoskeletal:  Negative for falls, joint pain and myalgias.   Gastrointestinal:  Negative for melena.   Genitourinary:  Negative for hematuria.   Neurological:  Negative for excessive daytime sleepiness.   Psychiatric/Behavioral:  Negative for depression. The patient is not nervous/anxious.    All other systems reviewed and are negative.        VITAL SIGNS     Visit Vitals  /80   Pulse 74   Ht 152.4 cm (60\")   Wt 80.5 kg (177 lb 6.4 oz)   SpO2 96%   BMI 34.65 kg/m²         Wt Readings from Last 3 Encounters:   01/12/24 80.5 kg (177 lb 6.4 oz)   06/01/23 80.9 kg (178 lb 4.8 oz)   01/11/23 81.5 kg (179 lb 9.6 oz)     Body mass index is 34.65 kg/m².      PHYSICAL " EXAMINATION     Constitutional:       General: Not in acute distress.  Neck:      Vascular: No carotid bruit or JVD.   Pulmonary:      Effort: Pulmonary effort is normal.      Breath sounds: Normal breath sounds.   Cardiovascular:      Normal rate. Regular rhythm.      Murmurs: There is no murmur.   Psychiatric:         Mood and Affect: Mood and affect normal.           REVIEWED DATA       ECG 12 Lead    Date/Time: 1/12/2024 9:57 AM  Performed by: Maren Castro MD    Authorized by: Maren Castro MD  Comparison: compared with previous ECG from 1/11/2023  Similar to previous ECG  Rhythm: sinus rhythm  BPM: 74  Conduction: conduction normal  ST Segments: ST segments normal  T Waves: T waves normal    Clinical impression: normal ECG                Lab Results   Component Value Date    GLUCOSE 95 01/06/2020    BUN 14 01/06/2020    CREATININE 0.55 (L) 01/06/2020    BCR 25.5 (H) 01/06/2020    K 4.4 01/06/2020    CO2 27.8 01/06/2020    CALCIUM 8.7 01/06/2020       ASSESSMENT & PLAN      Diagnosis Plan   1. Primary hypertension        2. Mixed hyperlipidemia        3. Obstructive sleep apnea            1.  Hypertension.  Blood pressure is well-controlled  2.  Hyperlipidemia.  She is on pravastatin.  No recent lipids to review.  Her lipids are followed by her primary provider.  3.  Hypothyroid.  On replacement  4.  COPD  5.  Obstructive sleep apnea.  She reports compliance with CPAP.  6.  Nonsignificant valvular heart disease.  Exam is normal.  She has no symptoms.    I offered to see her back as needed versus yearly and she feels more comfortable doing yearly follow-ups.  Follow-up with Nathalia next year.      Orders Placed This Encounter   Procedures    ECG 12 Lead     This order was created via procedure documentation     Order Specific Question:   Release to patient     Answer:   Routine Release [[id number]]           MEDICATIONS         Discharge Medications            Accurate as of January 12, 2024  9:58 AM. If  you have any questions, ask your nurse or doctor.                Continue These Medications        Instructions Start Date   albuterol (2.5 MG/3ML) 0.083% nebulizer solution  Commonly known as: PROVENTIL   2.5 mg, Nebulization, Every 4 Hours PRN      albuterol sulfate  (90 Base) MCG/ACT inhaler  Commonly known as: PROVENTIL HFA;VENTOLIN HFA;PROAIR HFA   2 puffs, Inhalation, Every 4 Hours PRN      aspirin 81 MG EC tablet   81 mg, Oral, Daily, WILL CHECK WITH DR. BARTON TO SEE WHEN SHE IS TO STOP ASPIRIN      buPROPion  MG 24 hr tablet  Commonly known as: WELLBUTRIN XL   TAKE 1 TABLET BY MOUTH ONCE DAILY IN THE MORNING. DO NOT CRUSH, CHEW OR SPLIT.      calcium carbonate-cholecalciferol 500-400 MG-UNIT tablet tablet   Oral, 2 Times Daily      citalopram 40 MG tablet  Commonly known as: CeleXA   40 mg, Oral, Every Morning      clindamycin 1 % external solution  Commonly known as: CLEOCIN T   Topical      COQ-10 PO   1 tablet, Oral, Daily      ferrous sulfate 325 (65 FE) MG tablet   325 mg, Oral, Daily      fexofenadine 180 MG tablet  Commonly known as: ALLEGRA   180 mg, Oral, Daily      fluticasone-salmeterol 250-50 MCG/DOSE DISKUS  Commonly known as: ADVAIR   1 puff, Inhalation, 2 Times Daily      gabapentin 300 MG capsule  Commonly known as: NEURONTIN   300 mg, Oral, Every Evening      ipratropium 0.06 % nasal spray  Commonly known as: ATROVENT   USE 1 TO 2 SPRAY(S) IN EACH NOSTRIL ONCE DAILY TO THREE TIMES DAILY      iVIZIA Dry Eyes 0.5 % solution  Generic drug: Povidone (PF)   Ophthalmic      ketoconazole 2 % shampoo  Commonly known as: NIZORAL   Topical      levocetirizine 5 MG tablet  Commonly known as: XYZAL   No dose, route, or frequency recorded.      levocetirizine 5 MG tablet  Commonly known as: XYZAL   5 mg, Oral      levothyroxine 50 MCG tablet  Commonly known as: SYNTHROID, LEVOTHROID   50 mcg, Oral, Daily      Magnesium 500 MG capsule   500 mg, Oral, Every Morning      Mucinex DM Maximum  Strength  MG tablet sustained-release 12 hour   1 capsule, Oral, As Needed      OMEGA-3 FISH OIL PO   1 capsule, Oral, Every Morning      pantoprazole 40 MG EC tablet  Commonly known as: PROTONIX   40 mg, Oral, Every Morning      pravastatin 40 MG tablet  Commonly known as: PRAVACHOL   40 mg, Oral, Nightly      raloxifene 60 MG tablet  Commonly known as: EVISTA   60 mg, Oral, Daily      senna 8.6 MG tablet  Commonly known as: SENOKOT   1 tablet, Oral, Nightly      sodium chloride 0.65 % nasal spray   1 spray, Nasal, As Needed      sucralfate 1 g tablet  Commonly known as: CARAFATE   1 g, Oral, 2 Times Daily, Crush in one inch of water.      terbinafine 250 MG tablet  Commonly known as: lamiSIL   250 mg, Oral, Daily      Triamcinolone Acetonide 55 MCG/ACT nasal inhaler  Commonly known as: NASACORT   2 sprays, Nasal, Daily      Vitamin C 500 MG capsule   2 tablets, Oral, 2 Times Daily      Vitamin D3 50 MCG (2000 UT) capsule   2,000 Units, Oral, 2 Times Daily             Stop These Medications      polyvinyl alcohol-povidone 1.4-0.6 % ophthalmic solution  Commonly known as: HYPOTEARS  Stopped by: MD Maren Chu MD  01/12/24  09:58 EST    Part of this note may be an electronic transcription/translation of spoken language to printed text using the Dragon dictation system.

## 2024-06-13 ENCOUNTER — OFFICE VISIT (OUTPATIENT)
Dept: GASTROENTEROLOGY | Facility: CLINIC | Age: 79
End: 2024-06-13
Payer: MEDICARE

## 2024-06-13 VITALS
SYSTOLIC BLOOD PRESSURE: 122 MMHG | TEMPERATURE: 97.8 F | HEIGHT: 60 IN | DIASTOLIC BLOOD PRESSURE: 70 MMHG | HEART RATE: 105 BPM | WEIGHT: 179.3 LBS | OXYGEN SATURATION: 96 % | BODY MASS INDEX: 35.2 KG/M2

## 2024-06-13 DIAGNOSIS — R15.1 FECAL SOILING: ICD-10-CM

## 2024-06-13 DIAGNOSIS — Z12.11 ENCOUNTER FOR SCREENING FOR MALIGNANT NEOPLASM OF COLON: ICD-10-CM

## 2024-06-13 DIAGNOSIS — Z80.0 FAMILY HISTORY OF COLON CANCER: ICD-10-CM

## 2024-06-13 DIAGNOSIS — K21.9 GASTROESOPHAGEAL REFLUX DISEASE WITHOUT ESOPHAGITIS: ICD-10-CM

## 2024-06-13 DIAGNOSIS — R94.8 ABNORMAL ANAL MANOMETRY: ICD-10-CM

## 2024-06-13 DIAGNOSIS — K59.04 CHRONIC IDIOPATHIC CONSTIPATION: Primary | ICD-10-CM

## 2024-06-13 RX ORDER — PANTOPRAZOLE SODIUM 40 MG/1
40 TABLET, DELAYED RELEASE ORAL EVERY MORNING
Qty: 90 TABLET | Refills: 3 | Status: SHIPPED | OUTPATIENT
Start: 2024-06-13

## 2024-06-13 RX ORDER — MIRABEGRON 50 MG/1
50 TABLET, EXTENDED RELEASE ORAL DAILY
COMMUNITY
Start: 2024-05-20 | End: 2024-08-18

## 2024-06-13 NOTE — PROGRESS NOTES
"Chief Complaint   Patient presents with    Follow-up     Needs refills of pantoprazole.  Also continued constipation           History of Present Illness  78-year-old female presents today for follow-up.  Last visit June 1, 2023.  Last colonoscopy June 6, 2022.  She has a history of constipation, rectal bleeding, ileal stenosis, fecal soiling, bloating, dysphagia.  She has had defecating proctogram and anal rectal manometry April 2017.  Family history of colon cancer in her mother at age 74.    She is requesting refill of pantoprazole.  This works well to control symptoms of acid reflux.  She denies forced regurgitation.    She has had numerous gynecological and bladder surgeries status post InterStim placement October 2017, April 2020 for battery of InterStim was changed.  She has had follow-up with Dr Spencer 5/20/2024.    She continues to struggle with constipation.  She takes Senokot 10 pills at a time once per week.  She has incomplete evacuation.  She strains to have a bowel movement.  She will have bright red blood per rectum at times.  Bowel movements are described as small, fragmented.  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  We discussed possibility of pelvic floor physical therapy and benefit as her schedule is less limited as her niece and nephews are getting older.    Previous treatments for constipation include Linzess (unpredictable urgency and incomplete response), lactulose, Amitiza, enemas, Senokot, Motegrity.      Vital Signs:   /70   Pulse 105   Temp 97.8 °F (36.6 °C)   Ht 152.4 cm (60\")   Wt 81.3 kg (179 lb 4.8 oz)   SpO2 96%   BMI 35.02 kg/m²     Body mass index is 35.02 kg/m².     Physical Exam  Vitals reviewed.   Constitutional:       General: She is not in acute distress.     Appearance: Normal appearance. She is not ill-appearing or " toxic-appearing.   Eyes:      General: No scleral icterus.  Pulmonary:      Effort: Pulmonary effort is normal. No respiratory distress.   Skin:     Coloration: Skin is not jaundiced.   Neurological:      Mental Status: She is alert and oriented to person, place, and time.   Psychiatric:         Mood and Affect: Mood normal.         Behavior: Behavior normal.         Thought Content: Thought content normal.         Judgment: Judgment normal.           Assessment and Plan    Diagnoses and all orders for this visit:    1. Chronic idiopathic constipation (Primary)    2. Gastroesophageal reflux disease without esophagitis  -     pantoprazole (PROTONIX) 40 MG EC tablet; Take 1 tablet by mouth Every Morning.  Dispense: 90 tablet; Refill: 3    3. Abnormal anal manometry    4. Fecal soiling    5. Family history of colon cancer    6. Encounter for screening for malignant neoplasm of colon       Colonoscopy recommended June 2025.  Also will repeat EGD June 2025 at the time of colonoscopy.  If trouble swallowing worsens prior to colonoscopy and EGD June 2025, patient encouraged to contact the office for additional recommendations, to consider EGD be performed sooner.    Encouraged patient to consider pelvic floor physical therapy, she was given written information and encouraged to contact the office if she is interested in referral and we will be happy to arrange.    Longstanding constipation, currently taking treatment for constipation once per week, high-dose of stimulant laxative, due to schedule, patient is unable to tolerate or take medications at more frequent interval but we did discuss taking a smaller amount at least twice per week which she is agreeable to consider, she has had a very strict schedule as she is the caregiver for her great niece and nephew, as they are getting older her schedule has more availability and would like for her to try more frequent use of Senokot as well as pelvic floor physical therapy,  she will let us know if she is interested in scheduling.    Acid reflux, chronic, stable, refill of pantoprazole sent electronically to pharmacy.              Patient Instructions   Reviewed colonoscopy June 6, 2022, recommend colonoscopy in 3 years for colon cancer surveillance     Plans for repeat EGD June 2025 with colonoscopy, placed in recall  However if trouble swallowing worsens, please call office and we determine if EGD with dialtion or other testing is needed based on symptoms    Please review pelvic floor physical therapy information and constat office if you are interested in referral and I will be happy to place    Change timing of Senna - take 5 pills twice per week to see if this provides more complete evacuation and less disruptive        EMR Dragon/Transcription Disclaimer:  This document has been Dictated utilizing Dragon dictation.

## 2024-06-13 NOTE — PATIENT INSTRUCTIONS
Reviewed colonoscopy June 6, 2022, recommend colonoscopy in 3 years for colon cancer surveillance     Plans for repeat EGD June 2025 with colonoscopy, placed in recall  However if trouble swallowing worsens, please call office and we determine if EGD with dialtion or other testing is needed based on symptoms    Please review pelvic floor physical therapy information and constat office if you are interested in referral and I will be happy to place    Change timing of Senna - take 5 pills twice per week to see if this provides more complete evacuation and less disruptive

## 2025-02-06 ENCOUNTER — OFFICE VISIT (OUTPATIENT)
Dept: CARDIOLOGY | Facility: CLINIC | Age: 80
End: 2025-02-06
Payer: MEDICARE

## 2025-02-06 VITALS
BODY MASS INDEX: 34.55 KG/M2 | HEART RATE: 66 BPM | WEIGHT: 176 LBS | HEIGHT: 60 IN | DIASTOLIC BLOOD PRESSURE: 68 MMHG | SYSTOLIC BLOOD PRESSURE: 138 MMHG

## 2025-02-06 DIAGNOSIS — R42 DIZZINESS: ICD-10-CM

## 2025-02-06 DIAGNOSIS — G47.33 OBSTRUCTIVE SLEEP APNEA: ICD-10-CM

## 2025-02-06 DIAGNOSIS — R55 NEAR SYNCOPE: ICD-10-CM

## 2025-02-06 DIAGNOSIS — E78.2 MIXED HYPERLIPIDEMIA: ICD-10-CM

## 2025-02-06 DIAGNOSIS — R00.2 PALPITATIONS: ICD-10-CM

## 2025-02-06 DIAGNOSIS — I10 PRIMARY HYPERTENSION: Primary | ICD-10-CM

## 2025-02-06 PROCEDURE — 3075F SYST BP GE 130 - 139MM HG: CPT | Performed by: NURSE PRACTITIONER

## 2025-02-06 PROCEDURE — 1160F RVW MEDS BY RX/DR IN RCRD: CPT | Performed by: NURSE PRACTITIONER

## 2025-02-06 PROCEDURE — 1159F MED LIST DOCD IN RCRD: CPT | Performed by: NURSE PRACTITIONER

## 2025-02-06 PROCEDURE — 99214 OFFICE O/P EST MOD 30 MIN: CPT | Performed by: NURSE PRACTITIONER

## 2025-02-06 PROCEDURE — 3078F DIAST BP <80 MM HG: CPT | Performed by: NURSE PRACTITIONER

## 2025-02-06 PROCEDURE — 93000 ELECTROCARDIOGRAM COMPLETE: CPT | Performed by: NURSE PRACTITIONER

## 2025-02-06 NOTE — PROGRESS NOTES
"Date of Office Visit: 25  Encounter Provider: ELENA Rucker  Place of Service: [facility] CARDIOLOGY  Patient Name: Valerie Rock  :1945    Chief Complaint   Patient presents with    Follow-up    Cardiac Valve Problem   :     HPI: Valerie Rock is a 79 y.o. female  with hyperlipidemia, COPD, obstructive sleep apnea, aortic root enlargement, aortic insufficiency, palpitations, mitral valve prolapse and premature atrial contraction.  She also has history of vaginal prolapse and bladder spasms.        She was followed by Dr. Poole.  I will visit with her in follow up today I reviewed her medical record.  She had issues in the past with atypical chest pain and had a normal heart catheterization.  Echocardiogram showed mild aortic root enlargement with mild aortic insufficiency and mitral valve prolapse.  She had a follow-up echocardiogram in  which did not show any of the above.  She then had some issues with dysphagia.  She had a video swallow 2020 which showed decreased motility of the lower to mid esophagus.    Last echo 2021 showed normal ventricular systolic function,Grade 2 diastolic dysfunction, mild thickening of the aortic valve with no stenosis or regurgitation, mild MR and borderline ascending aorta dilation at 3.7 cm.    She presents today for reassessment.  For the past 2 months she has had dizzy episodes.  These occur at random times such as while she is standing and cooking or the last episode happened a week and a half ago while at [employer] walking around.  She felt her heart racing and feels dizzy.  She either goes to lay down or sit down when it happens.  She says her vision \"goes black\".  She has never passed out but [facility] [place] she stood there and took deep breaths and held onto her cart for about 5 minutes until the episode passed.  No chest pain or tightness.  No edema.      No Known Allergies        Family and " "social history reviewed.     ROS  All other systems were reviewed and are negative          Objective:     Vitals:    02/06/25 0812   BP: 138/68   BP Location: Left arm   Patient Position: Sitting   Cuff Size: Adult   Pulse: 66   Weight: 79.8 kg (176 lb)   Height: 152.4 cm (60\")     Body mass index is 34.37 kg/m².    PHYSICAL EXAM:  Pulmonary:      Effort: Pulmonary effort is normal.      Breath sounds: Normal breath sounds.   Cardiovascular:      Normal rate. Regular rhythm.      Comments: Spider veins BLE- no edema           ECG 12 Lead    Date/Time: 2/6/2025 8:22 AM  Performed by: Nathalia Tariq APRN    Authorized by: Nathalia Tariq APRN  Comparison: compared with previous ECG   Similar to previous ECG  Rhythm: sinus rhythm  Rate: normal            Current Outpatient Medications   Medication Sig Dispense Refill    albuterol (PROVENTIL) (2.5 MG/3ML) 0.083% nebulizer solution Take 2.5 mg by nebulization Every 4 (Four) Hours As Needed.      albuterol sulfate  (90 Base) MCG/ACT inhaler Inhale 2 puffs Every 4 (Four) Hours As Needed.      Ascorbic Acid (VITAMIN C) 500 MG capsule Take 2 tablets by mouth 2 (two) times a day.      aspirin 81 MG EC tablet Take 1 tablet by mouth Daily. WILL CHECK WITH DR. BARTON TO SEE WHEN SHE IS TO STOP ASPIRIN      buPROPion XL (WELLBUTRIN XL) 150 MG 24 hr tablet TAKE 1 TABLET BY MOUTH ONCE DAILY IN THE MORNING. DO NOT CRUSH, CHEW OR SPLIT.      CALCIUM PO Take 600 mg by mouth Daily.      Cholecalciferol (VITAMIN D3) 2000 UNITS capsule Take 1 capsule by mouth 2 (Two) Times a Day.      citalopram (CeleXA) 40 MG tablet Take 1 tablet by mouth Every Morning.      clindamycin (CLEOCIN T) 1 % external solution Apply  topically to the appropriate area as directed.      Coenzyme Q10 (COQ-10 PO) Take 1 tablet by mouth Daily.      Dextromethorphan-Guaifenesin (MUCINEX DM MAXIMUM STRENGTH)  MG tablet sustained-release 12 hour Take 1 tablet by mouth As Needed.      ferrous sulfate 325 " (65 FE) MG tablet Take 1 tablet by mouth Daily.      fluticasone-salmeterol (ADVAIR) 250-50 MCG/DOSE DISKUS Inhale 1 puff 2 (Two) Times a Day.      gabapentin (NEURONTIN) 300 MG capsule Take 1 capsule by mouth Every Evening.      ipratropium (ATROVENT) 0.06 % nasal spray USE 1 TO 2 SPRAY(S) IN EACH NOSTRIL ONCE DAILY TO THREE TIMES DAILY      ketoconazole (NIZORAL) 2 % shampoo Apply  topically to the appropriate area as directed.      levothyroxine (SYNTHROID, LEVOTHROID) 50 MCG tablet Take 1 tablet by mouth Daily.      Magnesium 500 MG capsule Take 500 mg by mouth Every Morning.      Omega-3 Fatty Acids (OMEGA-3 FISH OIL PO) Take 1 capsule by mouth Every Morning.      pantoprazole (PROTONIX) 40 MG EC tablet Take 1 tablet by mouth Every Morning. 90 tablet 3    Povidone, PF, (iVIZIA Dry Eyes) 0.5 % solution Apply  to eye(s) as directed by provider.      pravastatin (PRAVACHOL) 40 MG tablet Take 1 tablet by mouth Every Night.      raloxifene (EVISTA) 60 MG tablet Take 1 tablet by mouth Daily.      senna (SENOKOT) 8.6 MG tablet tablet Take 1 tablet by mouth Every Night.      sodium chloride 0.65 % nasal spray 1 spray into the nostril(s) as directed by provider As Needed for Congestion.       No current facility-administered medications for this visit.     Assessment:       Diagnosis Plan   1. Primary hypertension  ECG 12 Lead    Adult Transthoracic Echo Complete W/ Cont if Necessary Per Protocol      2. Palpitations  Adult Transthoracic Echo Complete W/ Cont if Necessary Per Protocol    Holter Monitor - 72 Hour Up To 15 Days      3. Near syncope  Holter Monitor - 72 Hour Up To 15 Days      4. Dizziness  Holter Monitor - 72 Hour Up To 15 Days      5. Obstructive sleep apnea             Orders Placed This Encounter   Procedures    Holter Monitor - 72 Hour Up To 15 Days     Standing Status:   Future     Standing Expiration Date:   2/6/2026     Order Specific Question:   Reason for exam?     Answer:   Palpitations      Order Specific Question:   Reason for exam?     Answer:   Dizziness     Order Specific Question:   Reason for exam?     Answer:   Presyncope or Syncope     Order Specific Question:   Vendor:     Answer:   [employer] - i[employer]hythm     Order Specific Question:   Duration (days):     Answer:   14     Order Specific Question:   Cardiac implant:     Answer:   None     Order Specific Question:   Release to patient     Answer:   Routine Release [[id number]]    ECG 12 Lead     This order was created via procedure documentation     Order Specific Question:   Release to patient     Answer:   Routine Release [1400000002]    Adult Transthoracic Echo Complete W/ Cont if Necessary Per Protocol     Standing Status:   Future     Standing Expiration Date:   2/6/2026     Order Specific Question:   Reason for exam?     Answer:   Palpitations     Order Specific Question:   Reason for exam?     Answer:   Dyspnea     Order Specific Question:   Release to patient     Answer:   Routine Release [[id number]]         Plan:        1.  77-year-old female with dizzy spells and palpitations.  These occur simultaneously and they may not happen for a week but she has had intermittent spells over the last 2 months so we will obtain echocardiogram and 2-week heart monitor for further evaluation  2.  Hypertension blood pressure appears adequately controlled.  3 hyperlipidemia-reviewed lipid panel from August 2024 showing total cholesterol elevated at 231, LDL elevated at 128, HDL 92 she is on pravastatin 40 mg, co-Q enzyme 10 with omega-3 fatty acid.  4.  Hypothyroidism on replacement therapy  5.  COPD  6.  Esophageal dysmotility  7.  Obstructive sleep apnea she reports compliance with BiPAP.  8.  History of premature atrial contraction   9. Aortic valve sclerosis without regurgitation or stenosis on echo 11/2021  10.  Mild mitral valve regurgitation on echo November 2021  11.  Bladder incontinence.  She follows with urogynecology, Dr. Miguelangel Spencer           It  has been a pleasure to participate in this patient's care.      Thank you,  ELENA Rucker      **I used Dragon to dictate this note:**

## 2025-02-17 ENCOUNTER — HOSPITAL ENCOUNTER (OUTPATIENT)
Dept: CARDIOLOGY | Facility: HOSPITAL | Age: 80
Discharge: HOME | End: 2025-02-17
Admitting: NURSE PRACTITIONER
Payer: MEDICARE

## 2025-02-17 VITALS
DIASTOLIC BLOOD PRESSURE: 72 MMHG | HEART RATE: 70 BPM | HEIGHT: 60 IN | SYSTOLIC BLOOD PRESSURE: 136 MMHG | WEIGHT: 176 LBS | BODY MASS INDEX: 34.55 KG/M2

## 2025-02-17 DIAGNOSIS — R00.2 PALPITATIONS: ICD-10-CM

## 2025-02-17 DIAGNOSIS — I10 PRIMARY HYPERTENSION: ICD-10-CM

## 2025-02-17 LAB
AORTIC ARCH: 3.4 CM
AORTIC DIMENSIONLESS INDEX: 0.86 (DI)
ASCENDING AORTA: 3.2 CM
AV MEAN PRESS GRAD SYS DOP V1V2: 4.2 MMHG
AV VMAX SYS DOP: 140.3 CM/SEC
BH CV ECHO MEAS - ACS: 1.83 CM
BH CV ECHO MEAS - AO MAX PG: 7.9 MMHG
BH CV ECHO MEAS - AO ROOT AREA (BSA CORRECTED): 1.8 CM2
BH CV ECHO MEAS - AO ROOT DIAM: 3 CM
BH CV ECHO MEAS - AO V2 VTI: 30.4 CM
BH CV ECHO MEAS - AVA(I,D): 2.8 CM2
BH CV ECHO MEAS - EDV(CUBED): 88.2 ML
BH CV ECHO MEAS - EDV(MOD-SP2): 69 ML
BH CV ECHO MEAS - EDV(MOD-SP4): 81 ML
BH CV ECHO MEAS - EF(MOD-SP2): 55.1 %
BH CV ECHO MEAS - EF(MOD-SP4): 64.2 %
BH CV ECHO MEAS - ESV(MOD-SP2): 31 ML
BH CV ECHO MEAS - ESV(MOD-SP4): 29 ML
BH CV ECHO MEAS - FS: 31.9 %
BH CV ECHO MEAS - IVS/LVPW: 0.97 CM
BH CV ECHO MEAS - IVSD: 0.89 CM
BH CV ECHO MEAS - LAT PEAK E' VEL: 7.3 CM/SEC
BH CV ECHO MEAS - LV DIASTOLIC VOL/BSA (35-75): 45.8 CM2
BH CV ECHO MEAS - LV MASS(C)D: 131.6 GRAMS
BH CV ECHO MEAS - LV MAX PG: 6.1 MMHG
BH CV ECHO MEAS - LV MEAN PG: 2.6 MMHG
BH CV ECHO MEAS - LV SYSTOLIC VOL/BSA (12-30): 16.4 CM2
BH CV ECHO MEAS - LV V1 MAX: 123.8 CM/SEC
BH CV ECHO MEAS - LV V1 VTI: 26.3 CM
BH CV ECHO MEAS - LVIDD: 4.5 CM
BH CV ECHO MEAS - LVIDS: 3 CM
BH CV ECHO MEAS - LVOT AREA: 3.3 CM2
BH CV ECHO MEAS - LVOT DIAM: 2.04 CM
BH CV ECHO MEAS - LVPWD: 0.92 CM
BH CV ECHO MEAS - MED PEAK E' VEL: 6.4 CM/SEC
BH CV ECHO MEAS - MR MAX PG: 41 MMHG
BH CV ECHO MEAS - MR MAX VEL: 320.1 CM/SEC
BH CV ECHO MEAS - MV A DUR: 0.13 SEC
BH CV ECHO MEAS - MV A MAX VEL: 92.7 CM/SEC
BH CV ECHO MEAS - MV DEC SLOPE: 552.8 CM/SEC2
BH CV ECHO MEAS - MV DEC TIME: 0.23 SEC
BH CV ECHO MEAS - MV E MAX VEL: 109 CM/SEC
BH CV ECHO MEAS - MV E/A: 1.18
BH CV ECHO MEAS - MV MAX PG: 5 MMHG
BH CV ECHO MEAS - MV MEAN PG: 2.37 MMHG
BH CV ECHO MEAS - MV P1/2T: 58.7 MSEC
BH CV ECHO MEAS - MV V2 VTI: 35.6 CM
BH CV ECHO MEAS - MVA(P1/2T): 3.7 CM2
BH CV ECHO MEAS - MVA(VTI): 2.42 CM2
BH CV ECHO MEAS - PA V2 MAX: 111.3 CM/SEC
BH CV ECHO MEAS - PULM A REVS DUR: 0.09 SEC
BH CV ECHO MEAS - PULM A REVS VEL: 30.7 CM/SEC
BH CV ECHO MEAS - PULM DIAS VEL: 26.2 CM/SEC
BH CV ECHO MEAS - PULM S/D: 1.08
BH CV ECHO MEAS - PULM SYS VEL: 28.2 CM/SEC
BH CV ECHO MEAS - QP/QS: 0.56
BH CV ECHO MEAS - RAP SYSTOLE: 8 MMHG
BH CV ECHO MEAS - RV MAX PG: 3.3 MMHG
BH CV ECHO MEAS - RV V1 MAX: 90.7 CM/SEC
BH CV ECHO MEAS - RV V1 VTI: 15.2 CM
BH CV ECHO MEAS - RVOT DIAM: 2.01 CM
BH CV ECHO MEAS - RVSP: 31 MMHG
BH CV ECHO MEAS - SV(LVOT): 86.2 ML
BH CV ECHO MEAS - SV(MOD-SP2): 38 ML
BH CV ECHO MEAS - SV(MOD-SP4): 52 ML
BH CV ECHO MEAS - SV(RVOT): 47.9 ML
BH CV ECHO MEAS - SVI(LVOT): 48.7 ML/M2
BH CV ECHO MEAS - SVI(MOD-SP2): 21.5 ML/M2
BH CV ECHO MEAS - SVI(MOD-SP4): 29.4 ML/M2
BH CV ECHO MEAS - TAPSE (>1.6): 2.39 CM
BH CV ECHO MEAS - TR MAX PG: 19.8 MMHG
BH CV ECHO MEAS - TR MAX VEL: 222.6 CM/SEC
BH CV ECHO MEASUREMENTS AVERAGE E/E' RATIO: 15.91
BH CV XLRA - RV BASE: 3.8 CM
BH CV XLRA - RV LENGTH: 6.3 CM
BH CV XLRA - RV MID: 2.5 CM
BH CV XLRA - TDI S': 14.7 CM/SEC
LEFT ATRIUM VOLUME INDEX: 37.2 ML/M2
LV EF BIPLANE MOD: 59.6 %
SINUS: 3 CM
STJ: 2.6 CM

## 2025-02-17 PROCEDURE — 93306 TTE W/DOPPLER COMPLETE: CPT

## 2025-02-17 PROCEDURE — 93306 TTE W/DOPPLER COMPLETE: CPT | Performed by: INTERNAL MEDICINE

## 2025-02-18 ENCOUNTER — TELEPHONE (OUTPATIENT)
Dept: CARDIOLOGY | Facility: CLINIC | Age: 80
End: 2025-02-18
Payer: MEDICARE

## 2025-02-18 NOTE — TELEPHONE ENCOUNTER
Results and recommendations called to pt.  Instructed to call with any further questions or concerns.  Verbalized understanding.    Mayela Paulson RN  Triage Nurse, [facility] [place]  02/18/25 08:16 EST

## 2025-02-18 NOTE — TELEPHONE ENCOUNTER
Please inform patient echocardiogram shows no significant change from 2021.  Her heart remains strong and she has no significant valve disease.  No other significant changes and we will wait on the results of her heart monitor.

## 2025-03-13 ENCOUNTER — RESULTS FOLLOW-UP (OUTPATIENT)
Dept: CARDIOLOGY | Facility: CLINIC | Age: 80
End: 2025-03-13
Payer: MEDICARE

## 2025-03-13 DIAGNOSIS — R42 DIZZINESS: Primary | ICD-10-CM

## 2025-03-13 DIAGNOSIS — I48.0 PAF (PAROXYSMAL ATRIAL FIBRILLATION): ICD-10-CM

## 2025-03-13 DIAGNOSIS — R55 NEAR SYNCOPE: ICD-10-CM

## 2025-03-13 RX ORDER — METOPROLOL SUCCINATE 25 MG/1
25 TABLET, EXTENDED RELEASE ORAL NIGHTLY
Qty: 30 TABLET | Refills: 3 | Status: SHIPPED | OUTPATIENT
Start: 2025-03-13

## 2025-03-13 NOTE — TELEPHONE ENCOUNTER
I spoke with patient regarding results of Zio patch which shows short burst of tachycardia and less than 1% A-fib.  At this time we discussed staying on aspirin 81 mg daily to decrease bleed risk for minimal A-fib.  Will start metoprolol succinate 25 mg nightly to help suppress tachycardia/A-fib and symptoms.  She has an InterStim and was told that this is not MRI compatible.  For this reason we cannot do an MRI so I will arrange for CT of the head to rule out stroke given minimal A-fib and dizziness and near syncope.  Providing CT shows no stroke than I would hold off on full anticoagulation at this time.  Patient needs to follow-up in 4 to 6 weeks and patient is agreeable with plan.    Scheduling-please arrange 4 to 6-week follow-up with me

## 2025-04-09 ENCOUNTER — OFFICE VISIT (OUTPATIENT)
Dept: CARDIOLOGY | Age: 80
End: 2025-04-09
Payer: MEDICARE

## 2025-04-09 VITALS
BODY MASS INDEX: 34.59 KG/M2 | DIASTOLIC BLOOD PRESSURE: 74 MMHG | SYSTOLIC BLOOD PRESSURE: 118 MMHG | OXYGEN SATURATION: 99 % | WEIGHT: 176.2 LBS | HEIGHT: 60 IN | HEART RATE: 56 BPM

## 2025-04-09 DIAGNOSIS — R42 DIZZINESS: ICD-10-CM

## 2025-04-09 DIAGNOSIS — I10 PRIMARY HYPERTENSION: ICD-10-CM

## 2025-04-09 DIAGNOSIS — R55 NEAR SYNCOPE: ICD-10-CM

## 2025-04-09 DIAGNOSIS — E78.2 MIXED HYPERLIPIDEMIA: ICD-10-CM

## 2025-04-09 DIAGNOSIS — I48.0 PAF (PAROXYSMAL ATRIAL FIBRILLATION): Primary | ICD-10-CM

## 2025-04-09 DIAGNOSIS — G47.33 OBSTRUCTIVE SLEEP APNEA: ICD-10-CM

## 2025-04-09 RX ORDER — METOPROLOL SUCCINATE 25 MG/1
25 TABLET, EXTENDED RELEASE ORAL NIGHTLY
Qty: 90 TABLET | Refills: 1 | Status: SHIPPED | OUTPATIENT
Start: 2025-04-09

## 2025-04-09 NOTE — PROGRESS NOTES
Date of Office Visit: 25  Encounter Provider: ELENA Rucker  Place of Service: [facility] CARDIOLOGY  Patient Name: Valerie Rock  :1945    Chief Complaint   Patient presents with    Hypertension    Palpitations    Follow-up   :     HPI: Valerie Rock is a 79 y.o. female  with hyperlipidemia, COPD, obstructive sleep apnea, aortic root enlargement, aortic insufficiency, palpitations, mitral valve prolapse and premature atrial contraction.  She also has history of vaginal prolapse and bladder spasms.     She is followed by Dr. Maren Castro.  I will visit with her in follow-up and have reviewed her medical record.  She was previously followed by Dr. Alex Poole.   She had issues in the past with atypical chest pain and had a normal heart catheterization.  Echocardiogram showed mild aortic root enlargement with mild aortic insufficiency and mitral valve prolapse.  She had a follow-up echocardiogram in  which did not show any of the above.  She then had some issues with dysphagia.  She had a video swallow 2020 which showed decreased motility of the lower to mid esophagus.     Last echo 2021 showed normal ventricular systolic function,Grade 2 diastolic dysfunction, mild thickening of the aortic valve with no stenosis or regurgitation, mild MR and borderline ascending aorta dilation at 3.7 cm.    Repeat echo 2025 showed normal left ventricular systolic function with an EF of 59%, grade 2 diastolic dysfunction, no significant valvular abnormalities, mild biatrial enlargement and trivial pericardial effusion      She reported increased dizzy episodes x 2 months with feeling near syncopal.  She had a Zio patch which showed bursts of tachycardia and less than 1% A-fib.  It was decided to keep her on aspirin 81 mg daily due to low A-fib burden and start metoprolol succinate 25 mg nightly to help suppress symptoms.  CT of the head was  "ordered due to dizziness and near syncope to rule out stroke.    She presents today for 2-week follow-up and is tolerating metoprolol.  She had 1 episode of dizziness which lasted for about an hour.  She went to sit down and felt that her heart rate was elevated.  She did not check her blood pressure.  She continues to have occasional shortness of breath and occasional feet swelling but that is not every day.  She does not feel that the metoprolol has made dizziness worse.  She is scheduled for the CT of her head next week.  She has an InterStim so she cannot have an MRI.  Her gynecologist expressed concern about continuing Evista/raloxifene if CT were to show an area of old stroke.  No Known Allergies        Family and social history reviewed.     ROS  All other systems were reviewed and are negative          Objective:     Vitals:    04/09/25 0926   BP: 118/74   Pulse: 56   SpO2: 99%   Weight: 79.9 kg (176 lb 3.2 oz)   Height: 152.4 cm (60\")     Body mass index is 34.41 kg/m².    PHYSICAL EXAM:  Pulmonary:      Effort: Pulmonary effort is normal.      Breath sounds: Normal breath sounds.   Cardiovascular:      Normal rate. Regular rhythm.         Procedures      Current Outpatient Medications   Medication Sig Dispense Refill    albuterol (PROVENTIL) (2.5 MG/3ML) 0.083% nebulizer solution Take 2.5 mg by nebulization Every 4 (Four) Hours As Needed.      Ascorbic Acid (VITAMIN C) 500 MG capsule Take 2 tablets by mouth 2 (two) times a day.      aspirin 81 MG EC tablet Take 1 tablet by mouth Daily. WILL CHECK WITH DR. BARTON TO SEE WHEN SHE IS TO STOP ASPIRIN      buPROPion XL (WELLBUTRIN XL) 150 MG 24 hr tablet TAKE 1 TABLET BY MOUTH ONCE DAILY IN THE MORNING. DO NOT CRUSH, CHEW OR SPLIT.      CALCIUM PO Take 600 mg by mouth Daily.      Cholecalciferol (VITAMIN D3) 2000 UNITS capsule Take 1 capsule by mouth 2 (Two) Times a Day.      citalopram (CeleXA) 40 MG tablet Take 1 tablet by mouth Every Morning.      " clindamycin (CLEOCIN T) 1 % external solution Apply  topically to the appropriate area as directed.      Coenzyme Q10 (COQ-10 PO) Take 1 tablet by mouth Daily.      Dextromethorphan-Guaifenesin (MUCINEX DM MAXIMUM STRENGTH)  MG tablet sustained-release 12 hour Take 1 tablet by mouth As Needed.      ferrous sulfate 325 (65 FE) MG tablet Take 1 tablet by mouth Daily.      fluticasone-salmeterol (ADVAIR) 250-50 MCG/DOSE DISKUS Inhale 1 puff 2 (Two) Times a Day.      gabapentin (NEURONTIN) 300 MG capsule Take 1 capsule by mouth Every Evening.      ipratropium (ATROVENT) 0.06 % nasal spray USE 1 TO 2 SPRAY(S) IN EACH NOSTRIL ONCE DAILY TO THREE TIMES DAILY      ketoconazole (NIZORAL) 2 % shampoo Apply  topically to the appropriate area as directed.      levothyroxine (SYNTHROID, LEVOTHROID) 50 MCG tablet Take 1 tablet by mouth Daily.      Magnesium 500 MG capsule Take 500 mg by mouth Every Morning.      metoprolol succinate XL (TOPROL-XL) 25 MG 24 hr tablet Take 1 tablet by mouth Every Night. 30 tablet 3    Omega-3 Fatty Acids (OMEGA-3 FISH OIL PO) Take 1 capsule by mouth Every Morning.      pantoprazole (PROTONIX) 40 MG EC tablet Take 1 tablet by mouth Every Morning. 90 tablet 3    Povidone, PF, (iVIZIA Dry Eyes) 0.5 % solution Apply  to eye(s) as directed by provider.      pravastatin (PRAVACHOL) 40 MG tablet Take 1 tablet by mouth Every Night.      raloxifene (EVISTA) 60 MG tablet Take 1 tablet by mouth Daily.      senna (SENOKOT) 8.6 MG tablet tablet Take 1 tablet by mouth Every Night.      sodium chloride 0.65 % nasal spray Administer 1 spray into the nostril(s) as directed by provider As Needed for Congestion.      albuterol sulfate  (90 Base) MCG/ACT inhaler Inhale 2 puffs Every 4 (Four) Hours As Needed.       No current facility-administered medications for this visit.     Assessment:       Diagnosis Plan   1. PAF (paroxysmal atrial fibrillation)        2. Primary hypertension        3. Mixed  hyperlipidemia        4. Obstructive sleep apnea             No orders of the defined types were placed in this encounter.        Plan:   1.  79-year-old female with paroxysmal A-fib less than 1% on her and Zio patch March 2025.  Given low burden of A-fib she is on aspirin and not fully anticoagulated.  She has been on metoprolol succinate nightly and is tolerating.  She will continue the same and follow-up in 3 months  2.  Hypertension blood pressure appears adequately controlled.  She was advised to check blood pressure during dizziness episodes so we can evaluate if her blood pressure is dropping low.  3. hyperlipidemia-reviewed lipid panel from August 2024 showing total cholesterol elevated at 231, LDL elevated at 128, HDL 92 she is on pravastatin 40 mg, co-Q enzyme 10 with omega-3 fatty acid.  4.  Hypothyroidism on replacement therapy  5.  COPD  6.  Sinus tachycardia-now on Toprol  7.  Dizziness/near syncope-she will start checking her blood pressure during the episodes to see if we can identify any low readings that may contribute.  She will have her CT of the head on 4/15/2025.  8.  Esophageal dysmotility  9. Obstructive sleep apnea she reports compliance with BiPAP.  10.  History of premature atrial contraction   11. Aortic valve sclerosis without regurgitation or stenosis on echo 11/2021.  No significant change on last echo February 2025  12.  Mild mitral valve regurgitation on echo November 2021-no significant change on last echo 02/2025  13.  Bladder incontinence.  She has an InterStim which is not MRI compatible.  She follows with urogynecology, Dr. Miguelangel Spencer    Follow-up in 3 months.  Call sooner with any questions or concerns          It has been a pleasure to participate in this patient's care.      Thank you,  ELENA Rucker      **I used Dragon to dictate this note:**

## 2025-04-15 ENCOUNTER — HOSPITAL ENCOUNTER (OUTPATIENT)
Dept: CT IMAGING | Facility: HOSPITAL | Age: 80
Discharge: HOME | End: 2025-04-15
Admitting: NURSE PRACTITIONER
Payer: MEDICARE

## 2025-04-15 DIAGNOSIS — I48.0 PAF (PAROXYSMAL ATRIAL FIBRILLATION): ICD-10-CM

## 2025-04-15 DIAGNOSIS — R55 NEAR SYNCOPE: ICD-10-CM

## 2025-04-15 DIAGNOSIS — R42 DIZZINESS: ICD-10-CM

## 2025-04-15 PROCEDURE — 70450 CT HEAD/BRAIN W/O DYE: CPT

## 2025-04-15 NOTE — NURSING NOTE
"Dr. Zuluaga called to report CT Head without is \"negative\".  APRN called patient to inform. Patient ambulated out per self.  "

## 2025-05-28 ENCOUNTER — OFFICE VISIT (OUTPATIENT)
Dept: GASTROENTEROLOGY | Facility: CLINIC | Age: 80
End: 2025-05-28
Payer: MEDICARE

## 2025-05-28 VITALS
SYSTOLIC BLOOD PRESSURE: 132 MMHG | DIASTOLIC BLOOD PRESSURE: 72 MMHG | BODY MASS INDEX: 35.2 KG/M2 | HEART RATE: 72 BPM | TEMPERATURE: 97.8 F | OXYGEN SATURATION: 96 % | WEIGHT: 179.3 LBS | HEIGHT: 60 IN

## 2025-05-28 DIAGNOSIS — R13.14 PHARYNGOESOPHAGEAL DYSPHAGIA: ICD-10-CM

## 2025-05-28 DIAGNOSIS — Z80.0 FAMILY HISTORY OF COLON CANCER: ICD-10-CM

## 2025-05-28 DIAGNOSIS — K22.9 ABNORMALITY OF ESOPHAGUS: Primary | ICD-10-CM

## 2025-05-28 DIAGNOSIS — K44.9 HIATAL HERNIA: ICD-10-CM

## 2025-05-28 DIAGNOSIS — R13.19 ESOPHAGEAL DYSPHAGIA: ICD-10-CM

## 2025-05-28 DIAGNOSIS — Z12.11 ENCOUNTER FOR SCREENING FOR MALIGNANT NEOPLASM OF COLON: ICD-10-CM

## 2025-05-28 DIAGNOSIS — Z86.0100 HISTORY OF COLON POLYPS: ICD-10-CM

## 2025-05-28 DIAGNOSIS — K21.9 GASTROESOPHAGEAL REFLUX DISEASE WITHOUT ESOPHAGITIS: ICD-10-CM

## 2025-05-28 PROCEDURE — 99215 OFFICE O/P EST HI 40 MIN: CPT | Performed by: NURSE PRACTITIONER

## 2025-05-28 PROCEDURE — 1160F RVW MEDS BY RX/DR IN RCRD: CPT | Performed by: NURSE PRACTITIONER

## 2025-05-28 PROCEDURE — 3078F DIAST BP <80 MM HG: CPT | Performed by: NURSE PRACTITIONER

## 2025-05-28 PROCEDURE — 1159F MED LIST DOCD IN RCRD: CPT | Performed by: NURSE PRACTITIONER

## 2025-05-28 PROCEDURE — 3075F SYST BP GE 130 - 139MM HG: CPT | Performed by: NURSE PRACTITIONER

## 2025-05-28 RX ORDER — POLYETHYLENE GLYCOL 3350, SODIUM SULFATE ANHYDROUS, SODIUM BICARBONATE, SODIUM CHLORIDE, POTASSIUM CHLORIDE 236; 22.74; 6.74; 5.86; 2.97 G/4L; G/4L; G/4L; G/4L; G/4L
POWDER, FOR SOLUTION ORAL
Qty: 4000 ML | Refills: 0 | Status: SHIPPED | OUTPATIENT
Start: 2025-05-28

## 2025-05-28 RX ORDER — METOPROLOL TARTRATE 25 MG/1
25 TABLET, FILM COATED ORAL
COMMUNITY
End: 2025-05-28 | Stop reason: ALTCHOICE

## 2025-05-28 RX ORDER — PANTOPRAZOLE SODIUM 40 MG/1
40 TABLET, DELAYED RELEASE ORAL EVERY MORNING
Qty: 90 TABLET | Refills: 3 | Status: SHIPPED | OUTPATIENT
Start: 2025-05-28 | End: 2025-06-02

## 2025-05-28 RX ORDER — CETIRIZINE HYDROCHLORIDE 5 MG/1
5 TABLET ORAL DAILY
COMMUNITY

## 2025-05-28 NOTE — PATIENT INSTRUCTIONS
Treatment Plan:  Dysphagia: Schedule dedicated imaging of the esophagus and upper GI to evaluate the esophagus and hiatal hernia.   Given your schedule is more flexible during the summer, schedule upper endoscopy (EGD) and colonoscopy after cardiology appointment on 07/23/2025.   For longstanding constipation, you will need to initiate 2-day bowel preparation regimen before colonoscopy: take senna 3 days before and 2 days before the procedure, followed by prescription strength prep.  Hiatal Hernia: Assess current state with dedicated imaging and further recommendations will be made based on imaging results, also to consider evaluation with upper endoscopy (EGD).  Constipation: Continue using senna and organic product from [employer].   Follow 2-day bowel preparation regimen before colonoscopy.  Tachycardia: Follow up with cardiologist, Dr. Castro, on 07/23/2025 to ensure safety for colonoscopy. Monitor symptoms and manage with current medication.  Medication Management: Refill for pantoprazole sent to [facility] mail order pharmacy.  InterStim Device Management: Consult with Dr. Spencer regarding InterStim device during the summer.     Follow-Up Instructions:  Schedule dedicated imaging to evaluate esophagus and upper GI to assess hiatal hernia given worsening trouble swallowing, orders placed.  Additional recommendations will be made based on imaging results, large hiatal hernia, 5 cm noted November 2020 on EGD and abnormal video swallow study 11/2020 also reviewed  Plan upper endoscopy (EGD) and colonoscopy after cardiology appointment on 07/23/2025.  Follow 2-day bowel preparation regimen before colonoscopy: take senna 3 days before and 2 days before the procedure, followed by prescription strength prep.  Follow up with cardiologist, Dr. Castro, on 07/23/2025.  Recommend follow-up with your gynecologist Dr. Spencer regarding InterStim device during the summer while you are great niece and nephew are out of  school.  Refill for pantoprazole sent to [facility] mail order pharmacy.     Additional Notes:  The patient should avoid eating alone due to dysphagia symptoms.  The patient has been using applesauce to aid in swallowing medications and peanut butter crackers to facilitate the passage of other foods.  Strict dysphagia precautions-some modifications to help while you are having trouble swallowing include:   Avoiding highly textured foods such as meats and bulky foods such as bagels and breads.   Cutting food into smaller pieces.   Extensively chew foods.   Washing foods down with liquids and eating more slowly.  Also purposefully eating which includes avoiding distractions or talking while focusing on chewing completely and purposefully.   The patient has been in contact with a representative from [id number] for assistance with the InterStim device settings.  The patient plans to resume taking senna tomorrow night to manage constipation.  Family history of colon cancer in her mother at age 74 and personal history of tubular adenomatous colon polyps, last colonoscopy 6/8/2022 Dr. 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

## 2025-05-28 NOTE — PROGRESS NOTES
Chief Complaint   Patient presents with    Follow-up     Difficulty swallowing             GASTROENTEROLOGY SUMMARY    78-year-old female presents today for follow-up.  Last visit June 1, 2023.  Last colonoscopy June 6, 2022.  Last EGD 11/5/2020.  Family history of colon cancer in her mother at age 74.    She has a history of tubular adenomatous colon polyps, constipation with abnormal ARM and defecating proctogram, rectal bleeding, ileal stenosis, fecal soiling, bloating, dysphagia, large 5 cm hiatal hernia noted on EGD November 2020.    4/2017: defecating proctogram and anal rectal manometry.    She is a patient of Dr. Spencer, urogynecologist, last visit 6/17/2024.  She has had numerous gynecological and bladder surgeries including InterStim placement 10/2017, bladder sling placement/repair 2020 and 11/2024: cystourethroscopy with bulkamid urethral bulking.  4/9/2024: Battery of InterStim was changed.       Previous treatments for constipation:   Linzess (unpredictable urgency / incomplete response)  Lactulose  Amitiza  Enemas  Senokot  Motegrity.   History of Present Illness  The patient is an 79-year-old female who presents today for a follow-up visit.   Her last office visit was on 06/13/2024.    Dysphagia  She reports that pantoprazole has been effective in managing her symptoms.   However, she has been experiencing dysphagia, particularly with bulky foods such as bread and chicken, over the past 2 months.   She describes a sensation of food getting lodged in her oropharyngeal area, leading to choking episodes.   Occasionally, she also struggles with liquid intake and experiences coughing, which is a new symptom for her.   She has been using applesauce to aid in swallowing her medications and peanut butter crackers to facilitate the passage of other foods.   She also reports a gagging sensation, which has occurred in public settings, causing embarrassment. She was previously advised by a physician to avoid  eating alone due to these symptoms.   She has a history of esophageal dilation performed by Dr. Barrera on 11/05/2020.   She also underwent a swallow study with speech pathology on 11/17/2020.   She reports a sensation of impending regurgitation but no actual regurgitation.   She is currently on pantoprazole and requires a refill.  - Onset: Over the past 2 months.  - Location: Oropharyngeal area.  - Character: Sensation of food getting lodged, choking episodes, occasional struggles with liquid intake, coughing, gagging sensation, sensation of impending regurgitation.  - Alleviating Factors: Using applesauce to aid in swallowing medications, peanut butter crackers to facilitate the passage of other foods.  - Severity: Causes embarrassment, advised to avoid eating alone.    InterStim Device  She had an InterStim device placed by Dr. Spencer and is not currently following up with him.   She needs to change the setting on the device and has been in contact with a representative from [employer] who assisted her over the telephone.   She last saw Dr. Spencer in 06/2024.    Syncope and Tachycardia  She has been experiencing episodes of syncope, during which she feels the need to sit down immediately.   These episodes coincided with her annual cardiology checkup, during which she was found to have tachycardia on a 2-week heart monitor.   She was prescribed medication for this, which she believes has improved her condition.   She is scheduled for a follow-up with her cardiologist in a month.   She reports that her dizziness has improved since starting the new blood pressure medication, but she still experiences occasional tachycardia.   During these episodes, she attempts to relax and notes that her symptoms improve.   She does not monitor her blood pressure during these episodes.  - Onset: During annual cardiology checkup.  - Character: Episodes of syncope, tachycardia, dizziness.  - Alleviating Factors: Prescribed  medication, attempts to relax.  - Timing: Episodes coincide with annual cardiology checkup, improved since starting new blood pressure medication.  - Severity: Improved condition with medication, occasional tachycardia.    Constipation  She has a long-standing history of constipation and has undergone defecating proctogram and anorectal manometry.   She was previously on senna but has since switched to an organic product from [employer] due to unavailability of senna.   She has not been able to use this product regularly due to limitations in her schedule that do not allow her regular access to the restroom.  She reports irregular bowel movements, with one instance of having two bowel movements in a single day a few weeks ago.   She also reports small bowel movements that do not provide complete relief.   She plans to resume taking senna tomorrow night.  - Onset: Long-standing history.  - Character: Irregular bowel movements, small bowel movements that do not provide complete relief.  - Alleviating Factors: Previously on senna, plans to resume taking senna tomorrow night.  - Timing: Irregular use of organic product due to schedule.    PAST SURGICAL HISTORY:  Esophageal dilation on 11/05/2020  InterStim device placement  11/2024: cystourethroscopy with bulkamid urethral bulking   2020 bladder sling placement/repair    SOCIAL HISTORY  Marital Status: Lives alone.     Result Review :       6/6/2022 colonoscopy Dr. Barrera:  - Diverticulosis in the sigmoid colon and in the descending colon.   - There was significant looping of the colon.   - One 10 mm polyp in the ascending colon, removed with a cold snare -TA.   - Thickened folds of the mucosa in the ascending colon -hyperplastic polyp.    - Two 2 to 3 mm polyps in the transverse colon -fragments of developing hyperplastic polyp and tubular adenoma  - The distal rectum and anal verge are normal on retroflexion view.   - The examination was otherwise normal    11/5/2020 EGD  Dr. Barrera:  Esophageal plaques suspicious for esophageal Candida, brushings negative, no yeast identified.  Large hiatal hernia (5 cm).  Gastritis-minimal and focal chronic inflammation, intestinal metaplasia, no dysplasia, no H. pylori    11/17/2020 video swallow study with speech therapy:  FINDINGS: Oropharyngeal swallow is within functional limits. No penetration, aspiration, or abnormal oral or pharyngeal residue observed. Esophageal screening revealed significantly reduced motility in the lower to mid esophagus, with multiple episodes of retrograde  movement and esophageal retention. Further assessment of esophageal function is recommended. Please see full speech pathology report.  There are tertiary contractions in the hql-zz-somayu esophagus and the ydp-br-[id number] esophagus appears slightly dilated. Correlation with dedicated esophagram and/or upper endoscopy may be helpful.    Defecating proctogram April 2017.    Small anterior rectocele with complete rectal evacuation. Mild rectal incontinence more pronounced in the sitting position.    Anal rectal junction appears relatively low lying in neutral position.    Mild elevation of the puborectalis during squeeze maneuver.     Anal rectal manometry April 18, 2017.    Normal resting anal canal pressure.  Low squeeze pressure.    For sensation in the rectal vault was impaired.    Desire to defecate was impaired.    Maximum tolerance volume in the rectum was normal. Rectal anal inhibitory reflex was complete.    -Consistent with weak internal/external sphincter. -Consistent with impaired rectal sensation.    -Consistent with pelvic floor disorder.    -No defecation stimulation reported.    -Recommendations for pelvic floor retraining and questionable benefit of sacral stimulator.     June 1, 2017 colonoscopy: (for hematochezia and constipation- Bowel prep was good).    Perianal and digital rectal exam normal, multiple small mouth diverticula found in the sigmoid  "colon.    No evidence of stenosis of the ileocecal valve.    Hepatic flexure polyp consistent with tubular adenoma.      Vital Signs:   /72   Pulse 72   Temp 97.8 °F (36.6 °C)   Ht 152.4 cm (60\")   Wt 81.3 kg (179 lb 4.8 oz)   SpO2 96%   BMI 35.02 kg/m²     Body mass index is 35.02 kg/m².     Physical Exam  Vitals reviewed.   Constitutional:       General: She is not in acute distress.     Appearance: Normal appearance. She is well-developed. She is not diaphoretic.   HENT:      Head: Normocephalic and atraumatic.   Eyes:      General: No scleral icterus.  Cardiovascular:      Rate and Rhythm: Normal rate and regular rhythm.   Pulmonary:      Effort: Pulmonary effort is normal. No respiratory distress.      Breath sounds: Normal breath sounds.   Abdominal:      General: Bowel sounds are normal. There is no distension.      Palpations: Abdomen is soft.      Tenderness: There is no abdominal tenderness. There is no guarding or rebound.   Musculoskeletal:      Cervical back: No tenderness.   Lymphadenopathy:      Cervical: No cervical adenopathy.   Skin:     General: Skin is warm and dry.      Coloration: Skin is not jaundiced.   Neurological:      Mental Status: She is alert and oriented to person, place, and time.   Psychiatric:         Mood and Affect: Mood normal.         Behavior: Behavior normal.         Thought Content: Thought content normal.         Judgment: Judgment normal.         Assessment and Plan        Diagnoses and all orders for this visit:    1. Abnormality of esophagus (Primary)  -     Cancel: FL Upper GI Single Contrast With KUB  -     Discontinue: pantoprazole (PROTONIX) 40 MG EC tablet; Take 1 tablet by mouth Every Morning.  Dispense: 90 tablet; Refill: 3  -     FL Upper GI Double Contrast & KUB    2. Pharyngoesophageal dysphagia  -     Cancel: FL Upper GI Single Contrast With KUB  -     FL Upper GI Double Contrast & KUB    3. Esophageal dysphagia  -     Cancel: FL Upper GI Single " Contrast With KUB  -     FL Upper GI Double Contrast & KUB    4. Hiatal hernia  -     Cancel: FL Upper GI Single Contrast With KUB  -     Discontinue: pantoprazole (PROTONIX) 40 MG EC tablet; Take 1 tablet by mouth Every Morning.  Dispense: 90 tablet; Refill: 3  -     FL Upper GI Double Contrast & KUB    5. Encounter for screening for malignant neoplasm of colon  -     PEG 3350-KCl-NaBcb-NaCl-NaSulf (PEG-3350/Electrolytes) 236 g reconstituted solution; Take as directed for bowel prep  Dispense: 4000 mL; Refill: 0    6. Family history of colon cancer  -     PEG 3350-KCl-NaBcb-NaCl-NaSulf (PEG-3350/Electrolytes) 236 g reconstituted solution; Take as directed for bowel prep  Dispense: 4000 mL; Refill: 0    7. History of colon polyps  -     PEG 3350-KCl-NaBcb-NaCl-NaSulf (PEG-3350/Electrolytes) 236 g reconstituted solution; Take as directed for bowel prep  Dispense: 4000 mL; Refill: 0    8. Gastroesophageal reflux disease without esophagitis  -     Discontinue: pantoprazole (PROTONIX) 40 MG EC tablet; Take 1 tablet by mouth Every Morning.  Dispense: 90 tablet; Refill: 3       Assessment & Plan  1.  Oropharyngeal and esophageal dysphagia, esophageal dysphagia worsening.  - Previous workup for oropharyngeal dysphagia with video swallow study 11/17/2020 revealed normal oropharyngeal swallow.  Abnormal esophageal screening with reduced motility in the lower to mid esophagus with multiple episodes of retrograde movement and esophageal retention.  Also tertiary contractions in the mid to distal esophagus, mid to distal esophagus also appearing slightly dilated.  -Given abnormal video swallow study of the esophagus and large hiatal hernia on previous EGD November 2020, I recommend dedicated imaging of the esophagus and upper GI to further evaluate esophageal anatomy and hiatal hernia, orders placed.  -Currently will plan on EGD and colonoscopy after esophagram and cardiology appointment July 2025, orders placed but this may  need to be reconsidered based on esophagram findings and cardiology follow-up as well as clinical course.    - Given significant constipation, recommend patient initiate 2-day bowel preparation regimen before colonoscopy: take senna 3 days before and 2 days before the procedure, followed by prescription strength prep.    2.  Large hiatal Hernia on previous EGD and abnormal video swallow study of the esophagus:  - Strict dysphagia precautions  Some modifications to help while you are having trouble swallowing include:   Avoiding highly textured foods such as meats and bulky foods such as bagels and breads.   Cutting food into smaller pieces.   Extensively chew foods.   Washing foods down with liquids and eating more slowly.  Also purposefully eating which includes avoiding distractions or talking while focusing on chewing completely and purposefully.  - Continue pantoprazole 40 mg daily, refill provided    3.  Chronic constipation with abnormal defecating proctogram and anal rectal manometry 4/2017:  -Defecating proctogram with small anterior rectocele, mild rectal incontinence more pronounced in the seated position, anal rectal junction relatively low-lying in neutral position, mild elevation of the puborectalis during squeeze maneuver.  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  Again recommend pelvic floor physical therapy, patient defers at this time, will continue to discuss the benefit with patient and have asked her to consider aqua therapy when her schedule allows hey   - Continue using senna and organic product from [employer].  - Follow 2-day bowel preparation regimen before colonoscopy.    4. Tachycardia:  - Follow up with cardiologist, Dr. Castro, on 07/23/2025 to ensure safety for colonoscopy.  - Monitor symptoms and manage with current medication.    5. Medication Management:  - Refill for pantoprazole sent to [facility] mail order pharmacy.    6. InterStim Device Management:  - Recommend follow-up for urinary incontinence with urogynecologist Dr. Spencer regarding InterStim device during the summer.    7.  Personal history of tubular adenomatous colon polyps and family history of colon cancer in her mother at age 74  - Last colonoscopy 6/8/2022, due to the presence of multiple polyps that were adenomatous, Dr. Barrera recommends repeat colonoscopy at 3-year interval 6/2025, orders placed.  Patient will need to follow 2-day bowel prep prior to colonoscopy given significant constipation.  Will also need to make sure safety of bowel prep given esophageal dysphagia and large hiatal hernia once dedicated esophagus and upper GI imaging has resulted    Follow-up:  - 07/23/2025 with cardiologist.       I spent 44 minutes caring for Valerie on this date of service. This time includes time spent by me in the following activities:preparing for the visit, reviewing tests, performing a medically appropriate examination and/or evaluation , counseling and educating the patient/family/caregiver, ordering medications, tests, or procedures, and documenting information in the medical record      Patient Instructions   Treatment Plan:  Dysphagia: Schedule dedicated imaging of the esophagus and upper GI to evaluate the esophagus and hiatal  hernia.   Given your schedule is more flexible during the summer, schedule upper endoscopy (EGD) and colonoscopy after cardiology appointment on 07/23/2025.   For longstanding constipation, you will need to initiate 2-day bowel preparation regimen before colonoscopy: take senna 3 days before and 2 days before the procedure, followed by prescription strength prep.  Hiatal Hernia: Assess current state with dedicated imaging and further recommendations will be made based on imaging results, also to consider evaluation with upper endoscopy (EGD).  Constipation: Continue using senna and organic product from [employer].   Follow 2-day bowel preparation regimen before colonoscopy.  Tachycardia: Follow up with cardiologist, Dr. Castro, on 07/23/2025 to ensure safety for colonoscopy. Monitor symptoms and manage with current medication.  Medication Management: Refill for pantoprazole sent to [facility] pharmacy.  InterStim Device Management: Consult with Dr. Spencre regarding InterStim device during the summer.     Follow-Up Instructions:  Schedule dedicated imaging to evaluate esophagus and upper GI to assess hiatal hernia given worsening trouble swallowing, orders placed.  Additional recommendations will be made based on imaging results, large hiatal hernia, 5 cm noted November 2020 on EGD and abnormal video swallow study 11/2020 also reviewed  Plan upper endoscopy (EGD) and colonoscopy after cardiology appointment on 07/23/2025.  Follow 2-day bowel preparation regimen before colonoscopy: take senna 3 days before and 2 days before the procedure, followed by prescription strength prep.  Follow up with cardiologist, Dr. Castro, on 07/23/2025.  Recommend follow-up with your gynecologist Dr. Spencer regarding InterStim device during the summer while you are great niece and nephew are out of school.  Refill for pantoprazole sent to [facility] order pharmacy.     Additional Notes:  The patient should avoid eating alone  due to dysphagia symptoms.  The patient has been using applesauce to aid in swallowing medications and peanut butter crackers to facilitate the passage of other foods.  Strict dysphagia precautions-some modifications to help while you are having trouble swallowing include:   Avoiding highly textured foods such as meats and bulky foods such as bagels and breads.   Cutting food into smaller pieces.   Extensively chew foods.   Washing foods down with liquids and eating more slowly.  Also purposefully eating which includes avoiding distractions or talking while focusing on chewing completely and purposefully.   The patient has been in contact with a representative from [employer] for assistance with the InterStim device settings.  The patient plans to resume taking senna tomorrow night to manage constipation.  Family history of colon cancer in her mother at age 74 and personal history of tubular adenomatous colon polyps, last colonoscopy 6/8/2022 Dr. 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 6/23/2025  06:25 EDT    EMR Dragon/Transcription Disclaimer:  This document has been Dictated utilizing Dragon dictation.

## 2025-05-29 ENCOUNTER — PREP FOR SURGERY (OUTPATIENT)
Dept: SURGERY | Facility: SURGERY CENTER | Age: 80
End: 2025-05-29
Payer: MEDICARE

## 2025-05-29 ENCOUNTER — TELEPHONE (OUTPATIENT)
Dept: CARDIOLOGY | Age: 80
End: 2025-05-29

## 2025-05-29 DIAGNOSIS — R13.19 ESOPHAGEAL DYSPHAGIA: ICD-10-CM

## 2025-05-29 DIAGNOSIS — Z80.0 FAMILY HISTORY OF COLON CANCER: ICD-10-CM

## 2025-05-29 DIAGNOSIS — K44.9 HIATAL HERNIA: ICD-10-CM

## 2025-05-29 DIAGNOSIS — Z86.0100 HISTORY OF COLON POLYPS: ICD-10-CM

## 2025-05-29 DIAGNOSIS — Z12.11 ENCOUNTER FOR SCREENING FOR MALIGNANT NEOPLASM OF COLON: ICD-10-CM

## 2025-05-29 DIAGNOSIS — K22.9 ABNORMALITY OF ESOPHAGUS: Primary | ICD-10-CM

## 2025-05-29 RX ORDER — SODIUM CHLORIDE 0.9 % (FLUSH) 0.9 %
10 SYRINGE (ML) INJECTION AS NEEDED
OUTPATIENT
Start: 2025-05-29

## 2025-05-29 RX ORDER — SODIUM CHLORIDE 0.9 % (FLUSH) 0.9 %
3 SYRINGE (ML) INJECTION EVERY 12 HOURS SCHEDULED
OUTPATIENT
Start: 2025-05-29

## 2025-05-29 RX ORDER — SODIUM CHLORIDE, SODIUM LACTATE, POTASSIUM CHLORIDE, CALCIUM CHLORIDE 600; 310; 30; 20 MG/100ML; MG/100ML; MG/100ML; MG/100ML
30 INJECTION, SOLUTION INTRAVENOUS CONTINUOUS PRN
OUTPATIENT
Start: 2025-05-29 | End: 2025-05-30

## 2025-05-29 NOTE — TELEPHONE ENCOUNTER
Form complete. May hold aspirin 5-7 days prior and resume ASAP.      ELENA Rucker  [facility]   [address]  [place] [address]  Ph: [phone]  Fax: [phone]

## 2025-05-29 NOTE — TELEPHONE ENCOUNTER
[facility] is requesting a clearance for Pt to have  an EGD or Colonoscopy to be done on 8/4/25      Pt saw you last on 4/9/25    I will place this in your inbox      This may be faxed to [phone]

## 2025-06-02 DIAGNOSIS — K21.9 GASTROESOPHAGEAL REFLUX DISEASE WITHOUT ESOPHAGITIS: ICD-10-CM

## 2025-06-02 DIAGNOSIS — K44.9 HIATAL HERNIA: ICD-10-CM

## 2025-06-02 DIAGNOSIS — K22.9 ABNORMALITY OF ESOPHAGUS: ICD-10-CM

## 2025-06-02 RX ORDER — PANTOPRAZOLE SODIUM 40 MG/1
40 TABLET, DELAYED RELEASE ORAL EVERY MORNING
Qty: 90 TABLET | Refills: 3 | Status: SHIPPED | OUTPATIENT
Start: 2025-06-02

## 2025-06-10 ENCOUNTER — TELEPHONE (OUTPATIENT)
Dept: GASTROENTEROLOGY | Facility: CLINIC | Age: 80
End: 2025-06-10

## 2025-06-10 NOTE — TELEPHONE ENCOUNTER
Caller: Valerie Rock    Relationship: Self    Best call back number: [phone]      Who are you requesting to speak with (clinical staff, provider,  specific staff member):       What was the call regarding: PT WANTS TO CONFIRM APPT ON 06.18.25/WHETHER UPPER GI OR NOT. ALSO CONFIRM SCOPE 08.04.25. PT WANTS TO HAVE BOTH UPPER AND LOWER @ [facility]. PLEASE CONTACT PT TO ASSIST.

## 2025-06-18 ENCOUNTER — HOSPITAL ENCOUNTER (OUTPATIENT)
Dept: GENERAL RADIOLOGY | Facility: HOSPITAL | Age: 80
Discharge: HOME | End: 2025-06-18
Admitting: NURSE PRACTITIONER
Payer: MEDICARE

## 2025-06-18 PROCEDURE — A9270 NON-COVERED ITEM OR SERVICE: HCPCS | Performed by: NURSE PRACTITIONER

## 2025-06-18 PROCEDURE — 63710000001 SOD BICARB-CITRIC ACID-SIMETHICONE 2.21-1.53-0.04 G PACK: Performed by: NURSE PRACTITIONER

## 2025-06-18 PROCEDURE — 74246 X-RAY XM UPR GI TRC 2CNTRST: CPT

## 2025-06-18 PROCEDURE — 63710000001 BARIUM SULFATE 98 % RECONSTITUTED SUSPENSION: Performed by: NURSE PRACTITIONER

## 2025-06-18 PROCEDURE — 63710000001 BARIUM SULFATE 96 % RECONSTITUTED SUSPENSION: Performed by: NURSE PRACTITIONER

## 2025-06-18 RX ADMIN — BARIUM SULFATE 183 ML: 960 POWDER, FOR SUSPENSION ORAL at 08:33

## 2025-06-18 RX ADMIN — BARIUM SULFATE 135 ML: 980 POWDER, FOR SUSPENSION ORAL at 08:33

## 2025-06-18 RX ADMIN — ANTACID/ANTIFLATULENT 1 PACKET: 380; 550; 10; 10 GRANULE, EFFERVESCENT ORAL at 08:33

## 2025-07-23 ENCOUNTER — OFFICE VISIT (OUTPATIENT)
Dept: CARDIOLOGY | Age: 80
End: 2025-07-23
Payer: MEDICARE

## 2025-07-23 VITALS
WEIGHT: 175.4 LBS | BODY MASS INDEX: 34.44 KG/M2 | HEART RATE: 62 BPM | HEIGHT: 60 IN | OXYGEN SATURATION: 97 % | SYSTOLIC BLOOD PRESSURE: 110 MMHG | DIASTOLIC BLOOD PRESSURE: 70 MMHG

## 2025-07-23 DIAGNOSIS — K22.9 ABNORMALITY OF ESOPHAGUS: ICD-10-CM

## 2025-07-23 DIAGNOSIS — R00.2 PALPITATIONS: Primary | ICD-10-CM

## 2025-07-23 DIAGNOSIS — I10 PRIMARY HYPERTENSION: ICD-10-CM

## 2025-07-23 DIAGNOSIS — E78.2 MIXED HYPERLIPIDEMIA: ICD-10-CM

## 2025-07-23 DIAGNOSIS — I48.0 PAROXYSMAL ATRIAL FIBRILLATION: ICD-10-CM

## 2025-07-23 DIAGNOSIS — G47.33 OBSTRUCTIVE SLEEP APNEA: ICD-10-CM

## 2025-07-23 PROCEDURE — 1159F MED LIST DOCD IN RCRD: CPT | Performed by: INTERNAL MEDICINE

## 2025-07-23 PROCEDURE — 3074F SYST BP LT 130 MM HG: CPT | Performed by: INTERNAL MEDICINE

## 2025-07-23 PROCEDURE — 93000 ELECTROCARDIOGRAM COMPLETE: CPT | Performed by: INTERNAL MEDICINE

## 2025-07-23 PROCEDURE — 99214 OFFICE O/P EST MOD 30 MIN: CPT | Performed by: INTERNAL MEDICINE

## 2025-07-23 PROCEDURE — 1160F RVW MEDS BY RX/DR IN RCRD: CPT | Performed by: INTERNAL MEDICINE

## 2025-07-23 PROCEDURE — 3078F DIAST BP <80 MM HG: CPT | Performed by: INTERNAL MEDICINE

## 2025-07-23 RX ORDER — METOPROLOL SUCCINATE 25 MG/1
25 TABLET, EXTENDED RELEASE ORAL NIGHTLY
Qty: 90 TABLET | Refills: 3 | Status: SHIPPED | OUTPATIENT
Start: 2025-07-23

## 2025-07-23 RX ORDER — METOPROLOL TARTRATE 25 MG/1
25 TABLET, FILM COATED ORAL EVERY 8 HOURS PRN
Qty: 30 TABLET | Refills: 1 | Status: SHIPPED | OUTPATIENT
Start: 2025-07-23

## 2025-07-31 ENCOUNTER — ANESTHESIA EVENT (OUTPATIENT)
Dept: SURGERY | Facility: SURGERY CENTER | Age: 80
End: 2025-07-31
Payer: MEDICARE

## 2025-07-31 ENCOUNTER — ANESTHESIA (OUTPATIENT)
Dept: SURGERY | Facility: SURGERY CENTER | Age: 80
End: 2025-07-31
Payer: MEDICARE

## 2025-07-31 ENCOUNTER — HOSPITAL ENCOUNTER (OUTPATIENT)
Facility: SURGERY CENTER | Age: 80
Setting detail: HOSPITAL OUTPATIENT SURGERY
Discharge: HOME | End: 2025-07-31
Attending: INTERNAL MEDICINE | Admitting: INTERNAL MEDICINE
Payer: MEDICARE

## 2025-07-31 VITALS
BODY MASS INDEX: 34.32 KG/M2 | SYSTOLIC BLOOD PRESSURE: 123 MMHG | DIASTOLIC BLOOD PRESSURE: 78 MMHG | RESPIRATION RATE: 16 BRPM | TEMPERATURE: 98.6 F | WEIGHT: 174.8 LBS | OXYGEN SATURATION: 99 % | HEIGHT: 60 IN | HEART RATE: 75 BPM

## 2025-07-31 DIAGNOSIS — K44.9 HIATAL HERNIA: ICD-10-CM

## 2025-07-31 DIAGNOSIS — Z80.0 FAMILY HISTORY OF COLON CANCER: ICD-10-CM

## 2025-07-31 DIAGNOSIS — Z12.11 ENCOUNTER FOR SCREENING FOR MALIGNANT NEOPLASM OF COLON: ICD-10-CM

## 2025-07-31 DIAGNOSIS — R13.19 ESOPHAGEAL DYSPHAGIA: ICD-10-CM

## 2025-07-31 DIAGNOSIS — K22.9 ABNORMALITY OF ESOPHAGUS: ICD-10-CM

## 2025-07-31 DIAGNOSIS — Z86.0100 HISTORY OF COLON POLYPS: ICD-10-CM

## 2025-07-31 PROCEDURE — 25810000003 LACTATED RINGERS PER 1000 ML: Performed by: NURSE PRACTITIONER

## 2025-07-31 PROCEDURE — 43239 EGD BIOPSY SINGLE/MULTIPLE: CPT | Performed by: INTERNAL MEDICINE

## 2025-07-31 PROCEDURE — 25010000002 PROPOFOL 10 MG/ML EMULSION: Performed by: NURSE ANESTHETIST, CERTIFIED REGISTERED

## 2025-07-31 PROCEDURE — 25010000002 LIDOCAINE 2% SOLUTION: Performed by: NURSE ANESTHETIST, CERTIFIED REGISTERED

## 2025-07-31 PROCEDURE — 45385 COLONOSCOPY W/LESION REMOVAL: CPT | Performed by: INTERNAL MEDICINE

## 2025-07-31 PROCEDURE — 25010000002 GLYCOPYRROLATE PF 0.2 MG/ML SOLUTION: Performed by: NURSE ANESTHETIST, CERTIFIED REGISTERED

## 2025-07-31 PROCEDURE — 88305 TISSUE EXAM BY PATHOLOGIST: CPT | Performed by: INTERNAL MEDICINE

## 2025-07-31 PROCEDURE — 43249 ESOPH EGD DILATION <30 MM: CPT | Performed by: INTERNAL MEDICINE

## 2025-07-31 PROCEDURE — 25010000002 PROPOFOL 1000 MG/100ML EMULSION: Performed by: NURSE ANESTHETIST, CERTIFIED REGISTERED

## 2025-07-31 PROCEDURE — C1726 CATH, BAL DIL, NON-VASCULAR: HCPCS | Performed by: INTERNAL MEDICINE

## 2025-07-31 RX ORDER — PROPOFOL 10 MG/ML
INJECTION, EMULSION INTRAVENOUS AS NEEDED
Status: DISCONTINUED | OUTPATIENT
Start: 2025-07-31 | End: 2025-07-31 | Stop reason: SURG

## 2025-07-31 RX ORDER — SODIUM CHLORIDE 0.9 % (FLUSH) 0.9 %
3 SYRINGE (ML) INJECTION EVERY 12 HOURS SCHEDULED
Status: DISCONTINUED | OUTPATIENT
Start: 2025-07-31 | End: 2025-07-31 | Stop reason: HOSPADM

## 2025-07-31 RX ORDER — LIDOCAINE HYDROCHLORIDE 20 MG/ML
INJECTION, SOLUTION INFILTRATION; PERINEURAL AS NEEDED
Status: DISCONTINUED | OUTPATIENT
Start: 2025-07-31 | End: 2025-07-31 | Stop reason: SURG

## 2025-07-31 RX ORDER — SODIUM CHLORIDE, SODIUM LACTATE, POTASSIUM CHLORIDE, CALCIUM CHLORIDE 600; 310; 30; 20 MG/100ML; MG/100ML; MG/100ML; MG/100ML
30 INJECTION, SOLUTION INTRAVENOUS CONTINUOUS PRN
Status: DISCONTINUED | OUTPATIENT
Start: 2025-07-31 | End: 2025-07-31 | Stop reason: HOSPADM

## 2025-07-31 RX ORDER — SODIUM CHLORIDE 0.9 % (FLUSH) 0.9 %
10 SYRINGE (ML) INJECTION AS NEEDED
Status: DISCONTINUED | OUTPATIENT
Start: 2025-07-31 | End: 2025-07-31 | Stop reason: HOSPADM

## 2025-07-31 RX ADMIN — PROPOFOL 70 MG: 10 INJECTION, EMULSION INTRAVENOUS at 11:21

## 2025-07-31 RX ADMIN — PROPOFOL 100 MCG/KG/MIN: 10 INJECTION, EMULSION INTRAVENOUS at 11:22

## 2025-07-31 RX ADMIN — LIDOCAINE HYDROCHLORIDE 60 MG: 20 INJECTION, SOLUTION INFILTRATION; PERINEURAL at 11:21

## 2025-07-31 RX ADMIN — GLYCOPYRROLATE 0.2 MCG: 0.2 INJECTION, SOLUTION INTRAMUSCULAR; INTRAVITREAL at 11:21

## 2025-07-31 RX ADMIN — SODIUM CHLORIDE, POTASSIUM CHLORIDE, SODIUM LACTATE AND CALCIUM CHLORIDE 30 ML/HR: 600; 310; 30; 20 INJECTION, SOLUTION INTRAVENOUS at 10:55

## 2025-07-31 NOTE — ANESTHESIA PREPROCEDURE EVALUATION
Anesthesia Evaluation     Patient summary reviewed and Nursing notes reviewed   NPO Solid Status: > 8 hours  NPO Liquid Status: > 2 hours           Airway   Mallampati: II  TM distance: >3 FB  Neck ROM: full  No difficulty expected  Dental - normal exam     Pulmonary - normal exam   (+) asthma,sleep apnea  Cardiovascular - normal exam  Exercise tolerance: good (4-7 METS)    (+) hypertension, dysrhythmias Tachycardia, hyperlipidemia      Neuro/Psych  (+) psychiatric history  GI/Hepatic/Renal/Endo    (+) hiatal hernia, GERD, thyroid problem     Musculoskeletal (-) negative ROS    Abdominal    Substance History - negative use     OB/GYN negative ob/gyn ROS         Other                    Anesthesia Plan    ASA 3     MAC     intravenous induction     Anesthetic plan, risks, benefits, and alternatives have been provided, discussed and informed consent has been obtained with: patient.    CODE STATUS:

## 2025-07-31 NOTE — ANESTHESIA POSTPROCEDURE EVALUATION
"Patient: Valerie Rock    Procedure Summary       Date: 07/31/25 Room / Location: [facility]  [facility]    Anesthesia Start: 1113 Anesthesia Stop: 1210    Procedures:       ESOPHAGOGASTRODUODENOSCOPY (EGD) with dilitation to 15 Hiatal hernia gastritis      COLONOSCOPY FOR SCREENING Diagnosis:       Abnormality of esophagus      Esophageal dysphagia      Hiatal hernia      Encounter for screening for malignant neoplasm of colon      Family history of colon cancer      History of colon polyps      (Abnormality of esophagus [K22.9])      (Esophageal dysphagia [R13.19])      (Hiatal hernia [K44.9])      (Encounter for screening for malignant neoplasm of colon [Z12.11])      (Family history of colon cancer [Z80.0])      (History of colon polyps [Z86.0100])    Surgeons: Deuce Barrera MD Provider: Rudolph Martinez MD    Anesthesia Type: MAC ASA Status: 3            Anesthesia Type: MAC    Vitals  Vitals Value Taken Time   /77 07/31/25 12:41   Temp 37 °C (98.6 °F) 07/31/25 12:09   Pulse 75 07/31/25 12:39   Resp 16 07/31/25 12:39   SpO2 99 % 07/31/25 12:39   Vitals shown include unfiled device data.        Post Anesthesia Care and Evaluation    Patient location during evaluation: PACU  Level of consciousness: awake  Pain management: adequate    Airway patency: patent  Anesthetic complications: No anesthetic complications  PONV Status: controlled  Cardiovascular status: acceptable  Respiratory status: acceptable  Hydration status: acceptable    Comments: /78   Pulse 75   Temp 37 °C (98.6 °F) (Temporal)   Resp 16   Ht 152.4 cm (60\")   Wt 79.3 kg (174 lb 12.8 oz)   SpO2 99%   BMI 34.14 kg/m²       "

## 2025-07-31 NOTE — H&P
No chief complaint on file.      HPI  Hiatal hernia  Dysphagia  H/o polyps         Problem List:    Patient Active Problem List   Diagnosis    Chronic idiopathic constipation    Dysphagia    Encounter for screening for malignant neoplasm of colon    Colon polyps    Family history of colon cancer    Primary hypertension    Mixed hyperlipidemia    Obstructive sleep apnea    Abnormality of esophagus    Hiatal hernia    History of colon polyps    Palpitations    Paroxysmal atrial fibrillation       Medical History:    Past Medical History:   Diagnosis Date    Anxiety and depression     Aortic root enlargement     Aortic valve insufficiency     Arthritis     Asthma     Bladder spasms     Blepharitis of both eyes     Bone spur     LUMBAR    Chronic constipation     Diverticular disease     Dry eyes     Facial paresthesia 11/2018    HISTORY OF     Fecal incontinence     AT TIMES    GERD (gastroesophageal reflux disease)     Hashimoto's thyroiditis     History of fracture of nasal bone 07/22/2019    IN 3 PLACES AFTER FALL    Hyperlipidemia     MVP (mitral valve prolapse)     RLS (restless legs syndrome)     Seasonal allergies     Sleep apnea     USES C-PAP    Stress incontinence     WEARS PADS    Tinnitus     BOTH EARS AT TIMES    Urinary incontinence     Vaginal prolapse         Social History:    Social History     Socioeconomic History    Marital status:    Tobacco Use    Smoking status: Never     Passive exposure: Past (parents smoked in the home)    Smokeless tobacco: Never    Tobacco comments:     caffeine use - coffee   Vaping Use    Vaping status: Never Used   Substance and Sexual Activity    Alcohol use: No    Drug use: Never    Sexual activity: Defer       Family History:   Family History   Problem Relation Age of Onset    Heart attack Mother     Stroke Mother     Colon cancer Mother     Heart attack Brother     Malig Hyperthermia Neg Hx     Colon polyps Neg Hx     Crohn's disease Neg Hx     Irritable  bowel syndrome Neg Hx     Ulcerative colitis Neg Hx        Surgical History:   Past Surgical History:   Procedure Laterality Date    BLADDER SURGERY  1980 & 2003    BREAST BIOPSY Left 1969    BREAST BIOPSY Right 1979    BREAST CYST EXCISION Left 1985    BREAST DUCT EXCISION Left 2005    CARDIAC CATHETERIZATION      CATARACT EXTRACTION W/ INTRAOCULAR LENS IMPLANT Bilateral      COLONOSCOPY  2017    COLONOSCOPY N/A 6/6/2022    Procedure: COLONOSCOPY with polypectomy and biopsy;  Surgeon: Deuce Barrera MD;  Location: [facility] MAIN OR;  Service: Gastroenterology;  Laterality: N/A;  diverticulosis, polyps, thickened fold ascending colon, looping,    CYSTOSCOPY BOTOX INJECTION OF BLADDER      X 2    HYSTERECTOMY  1985    INTERSTIM PERC TEST  10/17/2017    DR. BRYAN BARTON    INTERSTIM PLACEMENT  10/31/2017    DR. BRYAN BARTON    PUBOVAGINAL SLING N/A 1/14/2020    Procedure: RETROPUBIC MID URATHRAL  SLING CYSTOCOPY;  Surgeon: Bryan Barton MD;  Location: [facility] MAIN OR;  Service: Gynecology    SACROSPINUS SUSPENSION  02/01/2018    THYROID SURGERY Right 07/27/2017    NODULES REMOVED    TONSILLECTOMY  1950    UPPER GASTROINTESTINAL ENDOSCOPY  ?       No current facility-administered medications for this encounter.    Current Outpatient Medications:     albuterol (PROVENTIL) (2.5 MG/3ML) 0.083% nebulizer solution, Take 2.5 mg by nebulization Every 4 (Four) Hours As Needed., Disp: , Rfl:     albuterol sulfate  (90 Base) MCG/ACT inhaler, Inhale 2 puffs Every 4 (Four) Hours As Needed., Disp: , Rfl:     Ascorbic Acid (VITAMIN C) 500 MG capsule, Take 2 tablets by mouth 2 (two) times a day., Disp: , Rfl:     aspirin 81 MG EC tablet, Take 1 tablet by mouth Daily. WILL CHECK WITH DR. BARTON TO SEE WHEN SHE IS TO STOP ASPIRIN, Disp: , Rfl:     buPROPion XL (WELLBUTRIN XL) 150 MG 24 hr tablet, TAKE 1 TABLET BY MOUTH ONCE DAILY IN THE MORNING. DO NOT CRUSH, CHEW OR SPLIT., Disp: , Rfl:     CALCIUM PO, Take  600 mg by mouth Daily., Disp: , Rfl:     cetirizine (zyrTEC) 5 MG tablet, Take 1 tablet by mouth Daily., Disp: , Rfl:     Cholecalciferol (VITAMIN D3) 2000 UNITS capsule, Take 1 capsule by mouth 2 (Two) Times a Day., Disp: , Rfl:     citalopram (CeleXA) 40 MG tablet, Take 1 tablet by mouth Every Morning., Disp: , Rfl:     clindamycin (CLEOCIN T) 1 % external solution, Apply  topically to the appropriate area as directed., Disp: , Rfl:     Coenzyme Q10 (COQ-10 PO), Take 1 tablet by mouth Daily., Disp: , Rfl:     ferrous sulfate 325 (65 FE) MG tablet, Take 1 tablet by mouth Daily., Disp: , Rfl:     fluticasone-salmeterol (ADVAIR) 250-50 MCG/DOSE DISKUS, Inhale 1 puff 2 (Two) Times a Day., Disp: , Rfl:     gabapentin (NEURONTIN) 300 MG capsule, Take 1 capsule by mouth Every Evening., Disp: , Rfl:     ipratropium (ATROVENT) 0.06 % nasal spray, USE 1 TO 2 SPRAY(S) IN EACH NOSTRIL ONCE DAILY TO THREE TIMES DAILY, Disp: , Rfl:     levothyroxine (SYNTHROID, LEVOTHROID) 50 MCG tablet, Take 1 tablet by mouth Daily., Disp: , Rfl:     Magnesium 500 MG capsule, Take 500 mg by mouth Every Morning., Disp: , Rfl:     metoprolol succinate XL (TOPROL-XL) 25 MG 24 hr tablet, Take 1 tablet by mouth Every Night., Disp: 90 tablet, Rfl: 3    metoprolol tartrate (LOPRESSOR) 25 MG tablet, Take 1 tablet by mouth Every 8 (Eight) Hours As Needed (palpitations)., Disp: 30 tablet, Rfl: 1    Omega-3 Fatty Acids (OMEGA-3 FISH OIL PO), Take 1 capsule by mouth Every Morning., Disp: , Rfl:     pantoprazole (PROTONIX) 40 MG EC tablet, TAKE 1 TABLET EVERY MORNING, Disp: 90 tablet, Rfl: 3    PEG 3350-KCl-NaBcb-NaCl-NaSulf (PEG-3350/Electrolytes) 236 g reconstituted solution, Take as directed for bowel prep (Patient not taking: Reported on 7/23/2025), Disp: 4000 mL, Rfl: 0    Povidone, PF, (iVIZIA Dry Eyes) 0.5 % solution, Apply  to eye(s) as directed by provider., Disp: , Rfl:     pravastatin (PRAVACHOL) 40 MG tablet, Take 1 tablet by mouth Every  Night., Disp: , Rfl:     raloxifene (EVISTA) 60 MG tablet, Take 1 tablet by mouth Daily., Disp: , Rfl:     senna (SENOKOT) 8.6 MG tablet tablet, Take 1 tablet by mouth Every Night., Disp: , Rfl:     sodium chloride 0.65 % nasal spray, Administer 1 spray into the nostril(s) as directed by provider As Needed for Congestion., Disp: , Rfl:     Allergies: No Known Allergies     The following portions of the patient's history were reviewed by me and updated as appropriate: review of systems, allergies, current medications, past family history, past medical history, past social history, past surgical history and problem list.    There were no vitals filed for this visit.    PHYSICAL EXAM:    CONSTITUTIONAL:  today's vital signs reviewed by me  GASTROINTESTINAL: abdomen is soft nontender nondistended with normal active bowel sounds, no masses are appreciated    Assessment/ Plan  Hiatal hernia  Dysphagia  H/o polyps    Egd and colonoscopy    Risks and benefits as well as alternatives to endoscopic evaluation were explained to the patient and they voiced understanding and wish to proceed.  These risks include but are not limited to the risk of bleeding, perforation, adverse reaction to sedation, and missed lesions.  The patient was given the opportunity to ask questions prior to the endoscopic procedure.

## 2025-08-04 LAB
CYTO UR: NORMAL
LAB AP CASE REPORT: NORMAL
LAB AP CLINICAL INFORMATION: NORMAL
PATH REPORT.FINAL DX SPEC: NORMAL
PATH REPORT.GROSS SPEC: NORMAL

## (undated) DEVICE — DEV INFL ALLIANCE2 SYS

## (undated) DEVICE — THE SINGLE USE ETRAP – POLYP TRAP IS USED FOR SUCTION RETRIEVAL OF ENDOSCOPICALLY REMOVED POLYPS.: Brand: ETRAP

## (undated) DEVICE — SNAR POLYP CAPTIVATOR RND STFF 2.4 240CM 10MM 1P/U

## (undated) DEVICE — GOWN PROC ENDOARMOR GI LVL3 HY/SHLD UNIV

## (undated) DEVICE — NDL SPINE 18G 31/2IN PNK

## (undated) DEVICE — ANTIBACTERIAL VIOLET BRAIDED (POLYGLACTIN 910), SYNTHETIC ABSORBABLE SUTURE: Brand: COATED VICRYL

## (undated) DEVICE — SINGLE-USE POLYPECTOMY SNARE: Brand: SENSATION SHORT THROW

## (undated) DEVICE — SUT MNCRYL PLS ANTIB UD 4/0 PS2 18IN

## (undated) DEVICE — KT ORCA ORCAPOD DISP STRL

## (undated) DEVICE — ADAPT CLN SCPE ENDO PORPOISE BX/50 DISP

## (undated) DEVICE — SYR LUERLOK 50ML

## (undated) DEVICE — SINGLE-USE BIOPSY FORCEPS: Brand: RADIAL JAW 4

## (undated) DEVICE — CATHETER,URETHRAL,REDRUBBER,STRL,16FR: Brand: MEDLINE

## (undated) DEVICE — MSK ENDO PORT O2 POM ELITE CURAPLEX A/

## (undated) DEVICE — VIAL FORMLN CAP 10PCT 20ML

## (undated) DEVICE — BLCK/BITE BLOX W/DENTL/RIM W/STRAP 54F

## (undated) DEVICE — Device

## (undated) DEVICE — ST IRR CYSTO W/SPK 77IN LF

## (undated) DEVICE — PK HYST VAG 40

## (undated) DEVICE — FLEX ADVANTAGE 1500CC: Brand: FLEX ADVANTAGE

## (undated) DEVICE — GOWN ISOL W/THUMB UNIV BLU BX/15

## (undated) DEVICE — CANN NASL CO2 TRULINK W/O2 A/

## (undated) DEVICE — GLV SURG PREMIERPRO ORTHO LTX PF SZ8 BRN

## (undated) DEVICE — SKIN PREP TRAY W/CHG: Brand: MEDLINE INDUSTRIES, INC.

## (undated) DEVICE — DRAPE,UNDERBUTTOCKS,PCH,STERILE: Brand: MEDLINE

## (undated) DEVICE — COVER,TABLE,44X90,STERILE: Brand: MEDLINE

## (undated) DEVICE — ESOPHAGEAL BALLOON DILATATION CATHETER: Brand: CRE FIXED WIRE

## (undated) DEVICE — TRAP SPECI SUCTIONPOLYPTRAP 4/CHMBR